# Patient Record
Sex: FEMALE | Race: WHITE | NOT HISPANIC OR LATINO | Employment: OTHER | URBAN - METROPOLITAN AREA
[De-identification: names, ages, dates, MRNs, and addresses within clinical notes are randomized per-mention and may not be internally consistent; named-entity substitution may affect disease eponyms.]

---

## 2023-02-20 PROBLEM — D12.6 ADENOMATOUS COLON POLYP: Status: ACTIVE | Noted: 2023-02-20

## 2023-02-20 PROBLEM — K21.9 ESOPHAGEAL REFLUX: Status: ACTIVE | Noted: 2023-02-20

## 2023-02-20 PROBLEM — M70.70 BURSITIS OF HIP: Status: ACTIVE | Noted: 2023-02-20

## 2023-02-20 PROBLEM — E55.9 VITAMIN D DEFICIENCY: Status: ACTIVE | Noted: 2023-02-20

## 2023-02-20 PROBLEM — M81.0 POSTMENOPAUSAL BONE LOSS: Status: ACTIVE | Noted: 2023-02-20

## 2023-02-20 PROBLEM — H01.009 BLEPHARITIS: Status: ACTIVE | Noted: 2023-02-20

## 2023-02-20 PROBLEM — H04.129 DRY EYE: Status: ACTIVE | Noted: 2023-02-20

## 2023-02-20 PROBLEM — R55 VASOVAGAL EPISODE: Status: ACTIVE | Noted: 2023-02-20

## 2023-02-20 PROBLEM — B37.31 YEAST VAGINITIS: Status: ACTIVE | Noted: 2023-02-20

## 2023-02-20 PROBLEM — E53.8 VITAMIN B12 DEFICIENCY: Status: ACTIVE | Noted: 2023-02-20

## 2023-02-20 PROBLEM — E78.5 HYPERLIPIDEMIA: Status: ACTIVE | Noted: 2023-02-20

## 2023-02-20 PROBLEM — H52.4 PRESBYOPIA: Status: ACTIVE | Noted: 2023-02-20

## 2023-02-20 PROBLEM — H52.4 HYPEROPIA WITH ASTIGMATISM AND PRESBYOPIA: Status: ACTIVE | Noted: 2023-02-20

## 2023-02-20 PROBLEM — N89.8 VAGINAL IRRITATION: Status: ACTIVE | Noted: 2023-02-20

## 2023-02-20 PROBLEM — Z96.1 PSEUDOPHAKIA, LEFT EYE: Status: ACTIVE | Noted: 2023-02-20

## 2023-02-20 PROBLEM — F02.80 FRONTOTEMPORAL DEMENTIA (MULTI): Status: ACTIVE | Noted: 2023-02-20

## 2023-02-20 PROBLEM — N18.31 CKD STAGE G3A/A1, GFR 45-59 AND ALBUMIN CREATININE RATIO <30 MG/G (MULTI): Status: ACTIVE | Noted: 2023-02-20

## 2023-02-20 PROBLEM — H26.9 CATARACT, CORTICAL, BOTH EYES: Status: ACTIVE | Noted: 2023-02-20

## 2023-02-20 PROBLEM — E66.09 CLASS 1 OBESITY DUE TO EXCESS CALORIES WITHOUT SERIOUS COMORBIDITY WITH BODY MASS INDEX (BMI) OF 30.0 TO 30.9 IN ADULT: Status: ACTIVE | Noted: 2023-02-20

## 2023-02-20 PROBLEM — R19.7 DIARRHEA IN ADULT PATIENT: Status: ACTIVE | Noted: 2023-02-20

## 2023-02-20 PROBLEM — H52.00 HYPEROPIA WITH ASTIGMATISM AND PRESBYOPIA: Status: ACTIVE | Noted: 2023-02-20

## 2023-02-20 PROBLEM — I10 HYPERTENSION: Status: ACTIVE | Noted: 2023-02-20

## 2023-02-20 PROBLEM — E03.9 HYPOTHYROIDISM: Status: ACTIVE | Noted: 2023-02-20

## 2023-02-20 PROBLEM — H25.13 CATARACT, NUCLEAR SCLEROTIC, BOTH EYES: Status: ACTIVE | Noted: 2023-02-20

## 2023-02-20 PROBLEM — H02.889 MGD (MEIBOMIAN GLAND DYSFUNCTION): Status: ACTIVE | Noted: 2023-02-20

## 2023-02-20 PROBLEM — N95.2 POSTMENOPAUSAL ATROPHIC VAGINITIS: Status: ACTIVE | Noted: 2023-02-20

## 2023-02-20 PROBLEM — H52.209 HYPEROPIA WITH ASTIGMATISM AND PRESBYOPIA: Status: ACTIVE | Noted: 2023-02-20

## 2023-02-20 PROBLEM — F41.9 ANXIETY: Status: ACTIVE | Noted: 2023-02-20

## 2023-02-20 PROBLEM — M18.11 OSTEOARTHRITIS OF RIGHT THUMB: Status: ACTIVE | Noted: 2023-02-20

## 2023-02-20 PROBLEM — E66.811 CLASS 1 OBESITY DUE TO EXCESS CALORIES WITHOUT SERIOUS COMORBIDITY WITH BODY MASS INDEX (BMI) OF 30.0 TO 30.9 IN ADULT: Status: ACTIVE | Noted: 2023-02-20

## 2023-02-20 PROBLEM — F03.90 DEMENTIA (MULTI): Status: ACTIVE | Noted: 2023-02-20

## 2023-02-20 PROBLEM — E73.9 LACTOSE INTOLERANCE: Status: ACTIVE | Noted: 2023-02-20

## 2023-02-20 PROBLEM — L98.9 BENIGN SKIN LESION OF MULTIPLE SITES: Status: ACTIVE | Noted: 2023-02-20

## 2023-02-20 PROBLEM — G31.09 FRONTOTEMPORAL DEMENTIA (MULTI): Status: ACTIVE | Noted: 2023-02-20

## 2023-02-20 RX ORDER — PAROXETINE 10 MG/1
1 TABLET, FILM COATED ORAL DAILY
COMMUNITY
Start: 2016-02-29 | End: 2023-10-21

## 2023-02-20 RX ORDER — ATORVASTATIN CALCIUM 10 MG/1
10 TABLET, FILM COATED ORAL DAILY
COMMUNITY
Start: 2017-01-10 | End: 2023-03-09

## 2023-02-20 RX ORDER — MEMANTINE HYDROCHLORIDE 10 MG/1
1 TABLET ORAL 2 TIMES DAILY
COMMUNITY
Start: 2016-02-29 | End: 2023-04-06

## 2023-02-20 RX ORDER — LOSARTAN POTASSIUM 50 MG/1
1 TABLET ORAL DAILY
COMMUNITY
Start: 2020-08-06 | End: 2023-10-21

## 2023-02-20 RX ORDER — DONEPEZIL HYDROCHLORIDE 10 MG/1
1 TABLET, FILM COATED ORAL DAILY
COMMUNITY
Start: 2018-12-29 | End: 2024-01-08

## 2023-02-20 RX ORDER — LEVOTHYROXINE SODIUM 150 UG/1
1 TABLET ORAL DAILY
COMMUNITY
Start: 2021-01-05 | End: 2024-04-18 | Stop reason: ALTCHOICE

## 2023-02-20 RX ORDER — LEVOTHYROXINE SODIUM 137 UG/1
1 TABLET ORAL DAILY
COMMUNITY
Start: 2022-09-03 | End: 2023-10-11

## 2023-02-20 RX ORDER — AMLODIPINE BESYLATE 10 MG/1
1 TABLET ORAL DAILY
COMMUNITY
Start: 2014-07-21 | End: 2024-02-05 | Stop reason: SDUPTHER

## 2023-03-09 DIAGNOSIS — E78.5 HYPERLIPIDEMIA, UNSPECIFIED HYPERLIPIDEMIA TYPE: Primary | ICD-10-CM

## 2023-03-09 RX ORDER — ATORVASTATIN CALCIUM 10 MG/1
TABLET, FILM COATED ORAL
Qty: 90 TABLET | Refills: 3 | Status: SHIPPED | OUTPATIENT
Start: 2023-03-09 | End: 2024-02-05 | Stop reason: SDUPTHER

## 2023-03-19 NOTE — PROGRESS NOTES
Subjective   Reason for Visit: Daksha Mcdonald is an 82 y.o. female here for her Subsequent Medicare Assessment               She only wears OTC readers for fine print     She complains of urinary incontinence   She has 1 episode of nocturia  She drinks liquids throughout the day.  There are times she has trouble holding her urine  She has not lost control yet.   She drinks 1 cup of coffee in the morning. She used to drink 2 cups     She is not exercising.  She is going to start walking   She has no balance issues to report     HEALTH:  PAP 6/10 - and no longer needed  Mammo 9/13, 10/15, 5/18, 7/19, 9/2020, 2/2022 and declines repeat   BD 8/12 T-1.2, 10/15 T-1.9, 5/18 T-1.5 9/2020 T-1.4, ordered 3/2023   Colon 4/12, 2/16 + adenomas and 4/2021 showed hyperplastic polyp and Q 3-5- this may be her last one   EKG 5/14 negative , 1/10/17, 5/19, 2/2021, 5/2022  Urine 5/19   Flu 2014 , 11/16, 10/18, 10/19, 9/2020, 11/2021, 1/2022, 10/2022  TDAP unknown and will update with injury   Prevnar 9/15   Pneumovax 11/2007 and last one 1/10/17   Zostavax never  Shingrix recommended and will check local pharmacies   Moderna CVD 3/1/2021 and 3/29/2021 booster 11/17/2021  Ophth She was seen with Dr Lopez. No glaucoma or MD. She had cataract surgery OU.   Copy of her Advanced Directives scanned in the chart 2/10/2022    Patient Care Team:  Tammy Alex MD as PCP - General  Tammy Alex MD as PCP - [employer] PCP     Review of Systems  All systems negative except those listed in the HPI     Objective   Vitals:  Visit Vitals  /60   Pulse 65   Temp 36.8 °C (98.2 °F)   Resp 16      Body mass index is 29.99 kg/m².     Physical Exam  Vitals reviewed. Exam conducted with a chaperone present.   Constitutional:       Appearance: Normal appearance.   HENT:      Head: Normocephalic.      Right Ear: Tympanic membrane, ear canal and external ear normal.      Left Ear: Tympanic membrane, ear canal and external ear normal.       Nose: Nose normal.      Mouth/Throat:      Pharynx: Oropharynx is clear.   Eyes:      Conjunctiva/sclera: Conjunctivae normal.   Cardiovascular:      Rate and Rhythm: Normal rate and regular rhythm.      Pulses: Normal pulses.      Heart sounds: Normal heart sounds.   Pulmonary:      Effort: Pulmonary effort is normal.      Breath sounds: Normal breath sounds.   Abdominal:      General: Bowel sounds are normal.      Palpations: Abdomen is soft.   Musculoskeletal:         General: Normal range of motion.      Cervical back: Normal range of motion and neck supple.   Skin:     General: Skin is warm.   Neurological:      General: No focal deficit present.      Mental Status: She is alert and oriented to person, place, and time.   Psychiatric:         Mood and Affect: Mood normal.         Behavior: Behavior normal.         Judgment: Judgment normal.       Assessment/Plan   Problem List Items Addressed This Visit          Endocrine/Metabolic    Hypothyroidism    Relevant Orders    TSH with reflex to Free T4 if abnormal       Other    Hyperlipidemia     Continue current regimen          Relevant Orders    CBC    Comprehensive Metabolic Panel    Lipid Panel     Other Visit Diagnoses       Asymptomatic menopausal state    -  Primary    Relevant Orders    XR DEXA bone density    Visit for screening mammogram               Subsequent Medicare Assessment completed  Labs ordered     Medicare Wellness completed  -  Discussed healthy diet and regular exercise.    -  Physical exam overall unremarkable. Immunizations reviewed and updated accordingly. Healthy lifestyle choices discussed (tobacco avoidance, appropriate alcohol use, avoidance of illicit substances).   -  Patient is wearing seatbelt.   -  Screening lab work ordered as indicated.    -  Age appropriate screening tests reviewed with patient.     We spent 15 minutes discussing depression screen and there is nothing found that is of concern for underling depression. The  PQH form was filled and the meds reviewed. She has no depression to report and good results with Paxil. She is more anxious than depressed     We spent 15 minutes discussing alcohol use and there are no concerns about overuse. The 15 min was spent in going over any issues of use of alcohol. Rare and socially only     She has grab bars in the shower.  She has not fallen recently and no risk of falls in the house   She has good lighting around the house and functioning smoke detectors.     She has no issues with her hearing to report     Her weight in office today is 172 with BMI of 29.99.   We spent 15 minutes discussing diet and weight loss  The struggle of weight loss persists  Recommend she lose 10 pounds. She is going to start walking     HTN: Stable   Continue amlodipine 10 mg daily and losartan 50 mg daily  EKG was normal 5/2022, no LVH or strain pattern noted   Stress test in 7/13 was normal as well.   Discussed diet and exercise for better BP control     I have spent 15 min face to face with this patient discussing their cardiac risk and behavioral therapies of nutrition choices and exercise. We are trying to eliminate habits that are contributing to their cardiac risk.  We agreed on a plan of how they can reduce their current CV risk   The patient's 10 yr CV risk was estimated at 22 %. We can decrease this to 18% with tighter control of her BP and increase the statin 2/2022. Did not recalculate due to age 3/2023.    HLD: Labs ordered, we will adjust if indicated 3/2023  Explained goal for LDL to  be less than 100 and ideally less than 70   Continue Lipitor 10 mg a day and ASA 81 mg 3 times a week   Discussed diet and exercise for better control of lipids     Hypothyroid: Labs ordered, we will adjust if indicated 3/2023  Continue levothyroxine 135 mcg daily     Pre-frontal cortical lobe dementia.   She feels her memory is fine and no recent decline per family   Continue memantine 10 mg daily and Aricept 10 mg  a day.  CT brain 2014 was normal.   She does not pay the bills but she does check over the accounts to balance them.   She can see Dr John SCHWARTZ now.     Anxiety:   She tried getting off Paxil twice and she did not do so well.  We tried to stop Paxil in 2015 and she was crying uncontrollably   Continue Paxil 10 mg daily. She feels this is helping with her anxiety.     GERD:  Improved. She is no longer taking omeprazole   EGD was mild gastritis and no polyps.    Urge incontinence: Explained diagnosis to patient in detail 3/2023  She complains of urinary incontinence   She has 1 episode of nocturia  She drinks liquids throughout the day.  There are times she has trouble holding her urine  She has not lost control yet.   She drinks 1 cup of coffee in the morning. She used to drink 2 cups   She drinks iced tea at night  Discussed bladder irritants and recommend she avoid coffee and tea   Recommend she stop drinking after 8 pm     Did great post Lap Choly at  on 5/21/14 with Dr Burnett.     Vitamin D deficiency:  Continue OTC vitamin D 3 2000 units daily  She is on oral B12 for her B12 deficiency and continue    Cold sore on the lip:  Recommend she try Abreva OTC until resolved     Eczema bilateral LE:  Recommended patient see dermatology. Patient declines at this time.    She does not need PAP anymore. She has no vaginal issues.   Mammo was normal in 2/2022 and declines repeating   Breast exam normal 3/2023  BD in 9/2020 was T- 1.4 and ordered 3/2023. Continue Calcium 600 mg BID, OTC Vitamin D 2000 UT daily and eat 2 servings of calcium enriched foods daily     Colonoscopy 4/2021 showed hyperplastic polyp x 2 and this may be her last one   She has a FmHx of colon cancer.     Ophth:   She was seen with Dr Lopez. No glaucoma or MD. She had cataract surgery OU. She wears OTC reading glasses   She will have her next eye exam faxed to my office in order to update her medical records.     I spent 15 min with the patient  discussing their wishes for end of life choices.   We discussed the need for a Living Will and that wishes should be discussed with Family.   The DNR status was reviewed, and we discussed the options of this and, the DNR _CC options as well.   We also went over how important it was to have these choices written down and clear for any surviving family so that their wishes are followed   The patient and I came to to following agreement : Her  is her medical POA and daughter She has a living will.   Copy of her Advanced Directives scanned in the chart 2/10/2022      Flu 2014 , 11/16, 10/18, 10/19, 9/2020, 11/2021, 1/2022, 10/2022   TDAP unknown and will update with injury   Prevnar 9/15   Pneumovax 11/2007 and last one 1/10/17   Zostavax never  Shingrix recommended and will check local pharmacies   Moderna CVD 3/1/2021 and 3/29/2021 booster 11/17/2021    RTC in 6 months for follow up or sooner if needed     Scribe Attestation  By signing my name below, I, Lubna Mathur  , Scribe   attest that this documentation has been prepared under the direction and in the presence of Tammy Alex MD.

## 2023-03-20 ENCOUNTER — OFFICE VISIT (OUTPATIENT)
Dept: PRIMARY CARE | Facility: CLINIC | Age: 83
End: 2023-03-20
Payer: MEDICARE

## 2023-03-20 VITALS
OXYGEN SATURATION: 96 % | HEART RATE: 65 BPM | WEIGHT: 172 LBS | RESPIRATION RATE: 16 BRPM | DIASTOLIC BLOOD PRESSURE: 60 MMHG | BODY MASS INDEX: 29.37 KG/M2 | TEMPERATURE: 98.2 F | HEIGHT: 64 IN | SYSTOLIC BLOOD PRESSURE: 138 MMHG

## 2023-03-20 DIAGNOSIS — N18.31 CKD STAGE G3A/A1, GFR 45-59 AND ALBUMIN CREATININE RATIO <30 MG/G (MULTI): ICD-10-CM

## 2023-03-20 DIAGNOSIS — Z12.31 VISIT FOR SCREENING MAMMOGRAM: ICD-10-CM

## 2023-03-20 DIAGNOSIS — E78.2 MIXED HYPERLIPIDEMIA: ICD-10-CM

## 2023-03-20 DIAGNOSIS — Z00.00 HEALTHCARE MAINTENANCE: ICD-10-CM

## 2023-03-20 DIAGNOSIS — F02.80 FRONTOTEMPORAL DEMENTIA (MULTI): ICD-10-CM

## 2023-03-20 DIAGNOSIS — F01.A0 MILD VASCULAR DEMENTIA WITHOUT BEHAVIORAL DISTURBANCE, PSYCHOTIC DISTURBANCE, MOOD DISTURBANCE, OR ANXIETY (MULTI): ICD-10-CM

## 2023-03-20 DIAGNOSIS — G31.09 FRONTOTEMPORAL DEMENTIA (MULTI): ICD-10-CM

## 2023-03-20 DIAGNOSIS — Z78.0 ASYMPTOMATIC MENOPAUSAL STATE: Primary | ICD-10-CM

## 2023-03-20 DIAGNOSIS — E03.9 HYPOTHYROIDISM, UNSPECIFIED TYPE: ICD-10-CM

## 2023-03-20 PROBLEM — M81.0 POSTMENOPAUSAL BONE LOSS: Status: RESOLVED | Noted: 2023-02-20 | Resolved: 2023-03-20

## 2023-03-20 PROBLEM — M18.11 OSTEOARTHRITIS OF RIGHT THUMB: Status: RESOLVED | Noted: 2023-02-20 | Resolved: 2023-03-20

## 2023-03-20 PROBLEM — M70.70 BURSITIS OF HIP: Status: RESOLVED | Noted: 2023-02-20 | Resolved: 2023-03-20

## 2023-03-20 PROCEDURE — 1036F TOBACCO NON-USER: CPT | Performed by: INTERNAL MEDICINE

## 2023-03-20 PROCEDURE — 1170F FXNL STATUS ASSESSED: CPT | Performed by: INTERNAL MEDICINE

## 2023-03-20 PROCEDURE — 1160F RVW MEDS BY RX/DR IN RCRD: CPT | Performed by: INTERNAL MEDICINE

## 2023-03-20 PROCEDURE — G0439 PPPS, SUBSEQ VISIT: HCPCS | Performed by: INTERNAL MEDICINE

## 2023-03-20 PROCEDURE — 3078F DIAST BP <80 MM HG: CPT | Performed by: INTERNAL MEDICINE

## 2023-03-20 PROCEDURE — 3075F SYST BP GE 130 - 139MM HG: CPT | Performed by: INTERNAL MEDICINE

## 2023-03-20 PROCEDURE — 1157F ADVNC CARE PLAN IN RCRD: CPT | Performed by: INTERNAL MEDICINE

## 2023-03-20 PROCEDURE — 1159F MED LIST DOCD IN RCRD: CPT | Performed by: INTERNAL MEDICINE

## 2023-03-20 ASSESSMENT — ACTIVITIES OF DAILY LIVING (ADL)
BATHING: INDEPENDENT
DOING_HOUSEWORK: INDEPENDENT
GROCERY_SHOPPING: INDEPENDENT
DRESSING: INDEPENDENT
TAKING_MEDICATION: INDEPENDENT
MANAGING_FINANCES: INDEPENDENT

## 2023-03-20 ASSESSMENT — PATIENT HEALTH QUESTIONNAIRE - PHQ9
2. FEELING DOWN, DEPRESSED OR HOPELESS: NOT AT ALL
1. LITTLE INTEREST OR PLEASURE IN DOING THINGS: NOT AT ALL
SUM OF ALL RESPONSES TO PHQ9 QUESTIONS 1 AND 2: 0

## 2023-03-20 NOTE — ASSESSMENT & PLAN NOTE
She feels her memory is fine and no recent decline per family   Continue memantine 10 mg daily and Aricept 10 mg a day.

## 2023-04-06 DIAGNOSIS — F01.A0 MILD VASCULAR DEMENTIA WITHOUT BEHAVIORAL DISTURBANCE, PSYCHOTIC DISTURBANCE, MOOD DISTURBANCE, OR ANXIETY (MULTI): Primary | ICD-10-CM

## 2023-04-06 RX ORDER — MEMANTINE HYDROCHLORIDE 10 MG/1
TABLET ORAL
Qty: 180 TABLET | Refills: 3 | Status: SHIPPED | OUTPATIENT
Start: 2023-04-06 | End: 2024-04-04 | Stop reason: SDUPTHER

## 2023-04-10 ENCOUNTER — LAB (OUTPATIENT)
Dept: LAB | Facility: LAB | Age: 83
End: 2023-04-10
Payer: MEDICARE

## 2023-04-10 DIAGNOSIS — E03.9 HYPOTHYROIDISM, UNSPECIFIED TYPE: ICD-10-CM

## 2023-04-10 DIAGNOSIS — E78.2 MIXED HYPERLIPIDEMIA: ICD-10-CM

## 2023-04-10 LAB
ALANINE AMINOTRANSFERASE (SGPT) (U/L) IN SER/PLAS: 11 U/L (ref 7–45)
ALBUMIN (G/DL) IN SER/PLAS: 4.2 G/DL (ref 3.4–5)
ALKALINE PHOSPHATASE (U/L) IN SER/PLAS: 90 U/L (ref 33–136)
ANION GAP IN SER/PLAS: 10 MMOL/L (ref 10–20)
ASPARTATE AMINOTRANSFERASE (SGOT) (U/L) IN SER/PLAS: 16 U/L (ref 9–39)
BILIRUBIN TOTAL (MG/DL) IN SER/PLAS: 0.5 MG/DL (ref 0–1.2)
CALCIUM (MG/DL) IN SER/PLAS: 9.1 MG/DL (ref 8.6–10.3)
CARBON DIOXIDE, TOTAL (MMOL/L) IN SER/PLAS: 29 MMOL/L (ref 21–32)
CHLORIDE (MMOL/L) IN SER/PLAS: 105 MMOL/L (ref 98–107)
CHOLESTEROL (MG/DL) IN SER/PLAS: 171 MG/DL (ref 0–199)
CHOLESTEROL IN HDL (MG/DL) IN SER/PLAS: 52.1 MG/DL
CHOLESTEROL/HDL RATIO: 3.3
CREATININE (MG/DL) IN SER/PLAS: 1.17 MG/DL (ref 0.5–1.05)
ERYTHROCYTE DISTRIBUTION WIDTH (RATIO) BY AUTOMATED COUNT: 12.8 % (ref 11.5–14.5)
ERYTHROCYTE MEAN CORPUSCULAR HEMOGLOBIN CONCENTRATION (G/DL) BY AUTOMATED: 31.5 G/DL (ref 32–36)
ERYTHROCYTE MEAN CORPUSCULAR VOLUME (FL) BY AUTOMATED COUNT: 93 FL (ref 80–100)
ERYTHROCYTES (10*6/UL) IN BLOOD BY AUTOMATED COUNT: 4.83 X10E12/L (ref 4–5.2)
GFR FEMALE: 47 ML/MIN/1.73M2
GLUCOSE (MG/DL) IN SER/PLAS: 95 MG/DL (ref 74–99)
HEMATOCRIT (%) IN BLOOD BY AUTOMATED COUNT: 45.1 % (ref 36–46)
HEMOGLOBIN (G/DL) IN BLOOD: 14.2 G/DL (ref 12–16)
LDL: 91 MG/DL (ref 0–99)
LEUKOCYTES (10*3/UL) IN BLOOD BY AUTOMATED COUNT: 6.9 X10E9/L (ref 4.4–11.3)
PLATELETS (10*3/UL) IN BLOOD AUTOMATED COUNT: 307 X10E9/L (ref 150–450)
POTASSIUM (MMOL/L) IN SER/PLAS: 4.2 MMOL/L (ref 3.5–5.3)
PROTEIN TOTAL: 6.4 G/DL (ref 6.4–8.2)
SODIUM (MMOL/L) IN SER/PLAS: 140 MMOL/L (ref 136–145)
THYROTROPIN (MIU/L) IN SER/PLAS BY DETECTION LIMIT <= 0.05 MIU/L: 2.38 MIU/L (ref 0.44–3.98)
TRIGLYCERIDE (MG/DL) IN SER/PLAS: 141 MG/DL (ref 0–149)
UREA NITROGEN (MG/DL) IN SER/PLAS: 17 MG/DL (ref 6–23)
VLDL: 28 MG/DL (ref 0–40)

## 2023-04-10 PROCEDURE — 36415 COLL VENOUS BLD VENIPUNCTURE: CPT

## 2023-04-10 PROCEDURE — 80053 COMPREHEN METABOLIC PANEL: CPT

## 2023-04-10 PROCEDURE — 84443 ASSAY THYROID STIM HORMONE: CPT

## 2023-04-10 PROCEDURE — 85027 COMPLETE CBC AUTOMATED: CPT

## 2023-04-10 PROCEDURE — 80061 LIPID PANEL: CPT

## 2023-05-10 NOTE — PROGRESS NOTES
Subjective   Patient ID: Daksha Mcdonald is a 82 y.o. female who presents for evaluation of left breast pain    She has pain in the left breast that began on approx 5/4/2023  She has not been lifting or moving things   The pain was around the left breast   She denies pain to the right breast     She complains of her hair being fizzy.  She has not changed hair products or started any new medications     HEALTH:  PAP 6/10 - and no longer needed  Mammo 9/13, 10/15, 5/18, 7/19, 9/2020, 2/2022, ordered 5/2023   BD 8/12 T-1.2, 10/15 T-1.9, 5/18 T-1.5 9/2020 T-1.4, 3/2023 T-1.7  Colon 4/12, 2/16 and 4/2021 showed hyperplastic polyp and Q 3-5- this may be her last one   EKG 5/14 negative , 1/10/17, 5/19, 2/2021, 5/2022  Urine 5/19   Flu 2014 , 11/16, 10/18, 10/19, 9/2020, 11/2021, 1/2022, 10/2022  TDAP unknown and will update with injury   Prevnar 9/15   Pneumovax 11/2007 and last one 1/10/17   Zostavax never  Shingrix recommended and will check local pharmacies   Moderna CVD 3/1/2021 and 3/29/2021 booster 11/17/2021  Ophth She was seen with Dr Lopez. No glaucoma or MD. She had cataract surgery OU.   Copy of her Advanced Directives scanned in the chart 2/10/2022        Review of Systems  All systems negative except those listed in the HPI      Objective   Visit Vitals  /70 (BP Location: Right arm, Patient Position: Sitting, BP Cuff Size: Adult)   Pulse 76    Body mass index is 30.16 kg/m².      Physical Exam  Vitals reviewed.   Constitutional:       Appearance: Normal appearance.   HENT:      Head: Normocephalic.      Right Ear: Tympanic membrane, ear canal and external ear normal.      Left Ear: Tympanic membrane, ear canal and external ear normal.      Nose: Nose normal.      Mouth/Throat:      Pharynx: Oropharynx is clear.   Eyes:      Conjunctiva/sclera: Conjunctivae normal.   Cardiovascular:      Rate and Rhythm: Normal rate and regular rhythm.      Pulses: Normal pulses.      Heart sounds: Normal heart sounds.    Pulmonary:      Effort: Pulmonary effort is normal.      Breath sounds: Normal breath sounds.   Chest:      Comments: tenderness left pectoral muscle area, no nodules or masses noted bilateral breasts  Abdominal:      General: Bowel sounds are normal.      Palpations: Abdomen is soft.   Musculoskeletal:         General: Normal range of motion.      Cervical back: Normal range of motion and neck supple.      Comments: tenderness left pectoral muscle   Skin:     General: Skin is warm.      Comments: Integumentary : the back of her hair is brittle and breaking, her hair is dry and feels like there is too much hair products in her hair    Neurological:      General: No focal deficit present.      Mental Status: She is alert and oriented to person, place, and time.   Psychiatric:         Mood and Affect: Mood normal.         Behavior: Behavior normal.         Thought Content: Thought content normal.         Judgment: Judgment normal.       Assessment/Plan   Problem List Items Addressed This Visit    None  Visit Diagnoses       Breast tenderness in female    -  Primary    Relevant Orders    BI mammo left diagnostic tomosynthesis    BI US breast limited left               Follow up completed   Reviewed her labs from 4/2023     Left breast pain outer quadrant without palpable mass: On exam : tenderness left pectoral muscle area, no nodules or masses noted bilateral breasts, no tenderness to the ribs 5/2023   She has pain in the left breast that began on approx 5/4/2023  She has not been lifting or moving things   The pain was around the left breast    She denies pain to the right breast   Mammo was normal in 2/2022 and orders placed today for left breast diagnostic mammo 5/2023. Radiology can determine if she needs an US of the breasts     Brittle hair: the back of her hair is brittle and breaking, her hair is dry and feels like there is too much hair products in her hair 5/2023. We increased her thyroid medications 6  months ago and this may be residual Sx from her thyroid levels   She complains of her hair being fizzy.  She has not changed hair products or started any new medications   She has a water softener on her house  She admits to laying on the couch a lot to watch television   She is washing her hair twice when she washes it   She does color her hair and she does not think her hairdresser has changed products   Recommend she get a silk pillow case to lay on to keep hair from breaking   Stop washing hair twice when showering   Recommend she talk with her  to see if there is a hair mask she can use to help with the brittle nature of her hair      She has no issues with her hearing to report     Her weight in office today is 173 with BMI of 30.16. We spent 15 minutes discussing diet and weight loss. The struggle of weight loss persists  Recommend she lose 10 pounds.    HTN: Stable   Continue amlodipine 10 mg daily and losartan 50 mg daily  EKG was normal 5/2022, no LVH or strain pattern noted   Stress test in 7/13 was normal as well.   Discussed diet and exercise for better BP control     The patient's 10 yr CV risk was estimated at 22 %. We can decrease this to 18% with tighter control of her BP and increase the statin 2/2022. Did not recalculate due to age 3/2023.    HLD: LDL 91 and HDL 52 on labs in 4/2023   Explained goal for LDL to  be less than 100 and ideally less than 70   Continue Lipitor 10 mg a day and ASA 81 mg 3 times a week   Discussed diet and exercise for better control of lipids     Hypothyroid: TSH 2.38 on labs in 4/2023  Continue levothyroxine 135 mcg daily     Pre-frontal cortical lobe dementia.   She feels her memory is fine and no recent decline per family   Continue memantine 10 mg daily and Aricept 10 mg a day.  CT brain 2014 was normal.   She does not pay the bills, she does check over the accounts to balance them.   She can see Dr John SCHWARTZ now.     Anxiety:   She tried getting off Paxil  twice and she did not do so well.  We tried to stop Paxil in 2015 and she was crying uncontrollably   Continue Paxil 10 mg daily. She feels this is helping with her anxiety.     GERD:  Improved. She is no longer taking omeprazole   EGD was mild gastritis and no polyps.    Urge incontinence:    Discussed bladder irritants and recommend she avoid coffee and tea   Recommend she stop drinking after 8 pm     Did great post Lap Choly at  on 5/21/14 with Dr Burnett.     Vitamin D deficiency:  Continue OTC vitamin D 3 2000 units daily  She is on oral B12 for her B12 deficiency and continue    Cold sore on the lip:  Recommend she try Abreva OTC until resolved     Eczema bilateral LE:  Recommended patient see dermatology. Patient declines at this time.    She does not need PAP anymore. She has no vaginal issues.   Mammo was normal in 2/2022 and declines repeating   Breast exam normal 3/2023  BD in 3/2023 T-1.7. Continue Calcium 600 mg BID, OTC Vitamin D 2000 UT daily and eat 2 servings of calcium enriched foods daily. Discussed importance of weight bearing exercises     Colonoscopy 4/2021 showed hyperplastic polyp x 2 and this may be her last one. She has a FmHx of colon cancer.     Ophth:   She was seen with Dr Lopez. No glaucoma or MD. She had cataract surgery OU. She wears OTC reading glasses   She will have her next eye exam faxed to my office in order to update her medical records.     Her  is her medical POA and daughter She has a living will.   Copy of her Advanced Directives scanned in the chart 2/10/2022      Flu 2014 , 11/16, 10/18, 10/19, 9/2020, 11/2021, 1/2022, 10/2022   TDAP unknown and will update with injury   Prevnar 9/15   Pneumovax 11/2007 and last one 1/10/17   Zostavax never  Shingrix recommended and will check local pharmacies   Moderna CVD 3/1/2021 and 3/29/2021 booster 11/17/2021    RTC in 6 months for follow up or sooner if needed     Scribe Attestation  By signing my name below, Lubna SCHAFER  Aline Mathur   attest that this documentation has been prepared under the direction and in the presence of Tammy Alex MD.

## 2023-05-11 ENCOUNTER — OFFICE VISIT (OUTPATIENT)
Dept: PRIMARY CARE | Facility: CLINIC | Age: 83
End: 2023-05-11
Payer: MEDICARE

## 2023-05-11 VITALS
BODY MASS INDEX: 29.53 KG/M2 | WEIGHT: 173 LBS | HEIGHT: 64 IN | SYSTOLIC BLOOD PRESSURE: 116 MMHG | DIASTOLIC BLOOD PRESSURE: 70 MMHG | HEART RATE: 76 BPM | OXYGEN SATURATION: 98 %

## 2023-05-11 DIAGNOSIS — N64.4 BREAST TENDERNESS IN FEMALE: Primary | ICD-10-CM

## 2023-05-11 DIAGNOSIS — I10 PRIMARY HYPERTENSION: ICD-10-CM

## 2023-05-11 DIAGNOSIS — N18.31 CKD STAGE G3A/A1, GFR 45-59 AND ALBUMIN CREATININE RATIO <30 MG/G (MULTI): ICD-10-CM

## 2023-05-11 DIAGNOSIS — E03.9 HYPOTHYROIDISM, UNSPECIFIED TYPE: ICD-10-CM

## 2023-05-11 DIAGNOSIS — E78.5 HYPERLIPIDEMIA, UNSPECIFIED HYPERLIPIDEMIA TYPE: ICD-10-CM

## 2023-05-11 PROBLEM — N89.8 VAGINAL IRRITATION: Status: RESOLVED | Noted: 2023-02-20 | Resolved: 2023-05-11

## 2023-05-11 PROBLEM — B37.31 YEAST VAGINITIS: Status: RESOLVED | Noted: 2023-02-20 | Resolved: 2023-05-11

## 2023-05-11 PROBLEM — R19.7 DIARRHEA IN ADULT PATIENT: Status: RESOLVED | Noted: 2023-02-20 | Resolved: 2023-05-11

## 2023-05-11 PROCEDURE — 3078F DIAST BP <80 MM HG: CPT | Performed by: INTERNAL MEDICINE

## 2023-05-11 PROCEDURE — 1159F MED LIST DOCD IN RCRD: CPT | Performed by: INTERNAL MEDICINE

## 2023-05-11 PROCEDURE — 99214 OFFICE O/P EST MOD 30 MIN: CPT | Performed by: INTERNAL MEDICINE

## 2023-05-11 PROCEDURE — 1157F ADVNC CARE PLAN IN RCRD: CPT | Performed by: INTERNAL MEDICINE

## 2023-05-11 PROCEDURE — 3074F SYST BP LT 130 MM HG: CPT | Performed by: INTERNAL MEDICINE

## 2023-05-11 PROCEDURE — 1160F RVW MEDS BY RX/DR IN RCRD: CPT | Performed by: INTERNAL MEDICINE

## 2023-05-11 PROCEDURE — 1036F TOBACCO NON-USER: CPT | Performed by: INTERNAL MEDICINE

## 2023-10-11 DIAGNOSIS — E03.9 HYPOTHYROIDISM, UNSPECIFIED TYPE: Primary | ICD-10-CM

## 2023-10-11 RX ORDER — LEVOTHYROXINE SODIUM 137 UG/1
137 TABLET ORAL DAILY
Qty: 90 TABLET | Refills: 3 | Status: SHIPPED | OUTPATIENT
Start: 2023-10-11 | End: 2024-02-05 | Stop reason: SDUPTHER

## 2023-10-21 DIAGNOSIS — I10 PRIMARY HYPERTENSION: Primary | ICD-10-CM

## 2023-10-21 DIAGNOSIS — F41.9 ANXIETY: ICD-10-CM

## 2023-10-21 RX ORDER — PAROXETINE 10 MG/1
10 TABLET, FILM COATED ORAL DAILY
Qty: 90 TABLET | Refills: 3 | Status: SHIPPED | OUTPATIENT
Start: 2023-10-21 | End: 2024-02-15 | Stop reason: SDUPTHER

## 2023-10-21 RX ORDER — LOSARTAN POTASSIUM 50 MG/1
50 TABLET ORAL DAILY
Qty: 90 TABLET | Refills: 3 | Status: SHIPPED | OUTPATIENT
Start: 2023-10-21 | End: 2024-02-15 | Stop reason: SDUPTHER

## 2024-01-08 DIAGNOSIS — F01.A0 MILD VASCULAR DEMENTIA WITHOUT BEHAVIORAL DISTURBANCE, PSYCHOTIC DISTURBANCE, MOOD DISTURBANCE, OR ANXIETY (MULTI): Primary | ICD-10-CM

## 2024-01-08 RX ORDER — DONEPEZIL HYDROCHLORIDE 10 MG/1
10 TABLET, FILM COATED ORAL DAILY
Qty: 90 TABLET | Refills: 3 | Status: SHIPPED | OUTPATIENT
Start: 2024-01-08 | End: 2024-03-15 | Stop reason: SDUPTHER

## 2024-01-26 ENCOUNTER — OFFICE VISIT (OUTPATIENT)
Dept: PRIMARY CARE | Facility: CLINIC | Age: 84
End: 2024-01-26
Payer: MEDICARE

## 2024-01-26 ENCOUNTER — HOSPITAL ENCOUNTER (OUTPATIENT)
Dept: RADIOLOGY | Facility: CLINIC | Age: 84
Discharge: HOME | End: 2024-01-26
Payer: MEDICARE

## 2024-01-26 VITALS
DIASTOLIC BLOOD PRESSURE: 66 MMHG | OXYGEN SATURATION: 96 % | HEART RATE: 67 BPM | HEIGHT: 64 IN | TEMPERATURE: 97.3 F | BODY MASS INDEX: 29.88 KG/M2 | SYSTOLIC BLOOD PRESSURE: 146 MMHG | WEIGHT: 175 LBS

## 2024-01-26 DIAGNOSIS — M75.01 ADHESIVE CAPSULITIS OF RIGHT SHOULDER: Primary | ICD-10-CM

## 2024-01-26 DIAGNOSIS — M75.01 ADHESIVE CAPSULITIS OF RIGHT SHOULDER: ICD-10-CM

## 2024-01-26 PROCEDURE — 73030 X-RAY EXAM OF SHOULDER: CPT | Mod: RT

## 2024-01-26 PROCEDURE — 1036F TOBACCO NON-USER: CPT | Performed by: NURSE PRACTITIONER

## 2024-01-26 PROCEDURE — 3078F DIAST BP <80 MM HG: CPT | Performed by: NURSE PRACTITIONER

## 2024-01-26 PROCEDURE — 1157F ADVNC CARE PLAN IN RCRD: CPT | Performed by: NURSE PRACTITIONER

## 2024-01-26 PROCEDURE — 99213 OFFICE O/P EST LOW 20 MIN: CPT | Performed by: NURSE PRACTITIONER

## 2024-01-26 PROCEDURE — 73030 X-RAY EXAM OF SHOULDER: CPT | Mod: RIGHT SIDE | Performed by: RADIOLOGY

## 2024-01-26 PROCEDURE — 3077F SYST BP >= 140 MM HG: CPT | Performed by: NURSE PRACTITIONER

## 2024-01-26 ASSESSMENT — PATIENT HEALTH QUESTIONNAIRE - PHQ9
1. LITTLE INTEREST OR PLEASURE IN DOING THINGS: NOT AT ALL
SUM OF ALL RESPONSES TO PHQ9 QUESTIONS 1 AND 2: 0
2. FEELING DOWN, DEPRESSED OR HOPELESS: NOT AT ALL

## 2024-01-26 NOTE — PROGRESS NOTES
"Subjective   Patient ID: Daksha Mcdonald is a 83 y.o. female who presents for sore shoulder (Right side.).    Patient of Dr. Alex.    Presents with right shoulder pain which starts at the shoulder and ends just above her elbow. Describes as sore and achy. Has assocaited loss of ability to lift her arm overhead. Feels better when she holds her arm off across her body.   No numbness of tingling.   No injury that she can recall but  states she did fall recently.   She did fall weeks ago. Tripped over slippers and took a little tumble but doesn't remember falling onto arm but states may have landed on an outstretched arm to catch herself. States never hgad immediate pain or symptoms.  No vaccines recently in this arm.   No history of shoulder issues.   Tried XS tylenol OTC which doesn't help much.          Review of Systems  otherwise negative aside from what was mentioned above in HPI.    Objective   /66   Pulse 67   Temp 36.3 °C (97.3 °F)   Ht 1.613 m (5' 3.5\")   Wt 79.4 kg (175 lb)   SpO2 96%   BMI 30.51 kg/m²     Physical Exam  Constitutional:       Appearance: Normal appearance.   Cardiovascular:      Rate and Rhythm: Normal rate and regular rhythm.   Pulmonary:      Effort: Pulmonary effort is normal.   Abdominal:      General: Abdomen is flat.   Musculoskeletal:      Right shoulder: Deformity, tenderness and bony tenderness present. Decreased range of motion. Decreased strength.      Left shoulder: Normal.   Neurological:      General: No focal deficit present.      Mental Status: She is alert and oriented to person, place, and time. Mental status is at baseline.   Psychiatric:         Mood and Affect: Mood normal.         Behavior: Behavior normal.         Thought Content: Thought content normal.         Judgment: Judgment normal.         Assessment/Plan   Problem List Items Addressed This Visit             ICD-10-CM    Adhesive capsulitis of right shoulder - Primary M75.01     Discussed " possibilities including frozen shoulder, shoulder dislocation, fracture, rotator cuff etiology.   XR shoulder ordered.  Hold off on PT until XR is complete.         Relevant Orders    XR shoulder right 2+ views    Referral to Orthopaedic Surgery

## 2024-01-26 NOTE — ASSESSMENT & PLAN NOTE
Discussed possibilities including frozen shoulder, shoulder dislocation, fracture, rotator cuff etiology.   XR shoulder ordered.  Hold off on PT until XR is complete.

## 2024-01-29 ENCOUNTER — TELEPHONE (OUTPATIENT)
Dept: PRIMARY CARE | Facility: CLINIC | Age: 84
End: 2024-01-29
Payer: MEDICARE

## 2024-02-05 DIAGNOSIS — E03.9 HYPOTHYROIDISM, UNSPECIFIED TYPE: ICD-10-CM

## 2024-02-05 DIAGNOSIS — E78.5 HYPERLIPIDEMIA, UNSPECIFIED HYPERLIPIDEMIA TYPE: ICD-10-CM

## 2024-02-05 RX ORDER — LEVOTHYROXINE SODIUM 137 UG/1
137 TABLET ORAL DAILY
Qty: 90 TABLET | Refills: 0 | Status: SHIPPED | OUTPATIENT
Start: 2024-02-05 | End: 2024-05-21 | Stop reason: SDUPTHER

## 2024-02-05 RX ORDER — AMLODIPINE BESYLATE 10 MG/1
10 TABLET ORAL DAILY
Qty: 90 TABLET | Refills: 0 | Status: SHIPPED | OUTPATIENT
Start: 2024-02-05 | End: 2024-05-31

## 2024-02-05 RX ORDER — ATORVASTATIN CALCIUM 10 MG/1
10 TABLET, FILM COATED ORAL DAILY
Qty: 90 TABLET | Refills: 0 | Status: SHIPPED | OUTPATIENT
Start: 2024-02-05 | End: 2024-05-31

## 2024-02-09 ENCOUNTER — TELEPHONE (OUTPATIENT)
Dept: PRIMARY CARE | Facility: CLINIC | Age: 84
End: 2024-02-09

## 2024-02-09 ENCOUNTER — OFFICE VISIT (OUTPATIENT)
Dept: ORTHOPEDIC SURGERY | Facility: CLINIC | Age: 84
End: 2024-02-09
Payer: MEDICARE

## 2024-02-09 DIAGNOSIS — M25.811 IMPINGEMENT OF RIGHT SHOULDER: Primary | ICD-10-CM

## 2024-02-09 DIAGNOSIS — M75.01 ADHESIVE CAPSULITIS OF RIGHT SHOULDER: ICD-10-CM

## 2024-02-09 PROCEDURE — 99213 OFFICE O/P EST LOW 20 MIN: CPT | Performed by: ORTHOPAEDIC SURGERY

## 2024-02-09 PROCEDURE — 1159F MED LIST DOCD IN RCRD: CPT | Performed by: ORTHOPAEDIC SURGERY

## 2024-02-09 PROCEDURE — 2500000004 HC RX 250 GENERAL PHARMACY W/ HCPCS (ALT 636 FOR OP/ED): Performed by: ORTHOPAEDIC SURGERY

## 2024-02-09 PROCEDURE — 1157F ADVNC CARE PLAN IN RCRD: CPT | Performed by: ORTHOPAEDIC SURGERY

## 2024-02-09 PROCEDURE — 99203 OFFICE O/P NEW LOW 30 MIN: CPT | Performed by: ORTHOPAEDIC SURGERY

## 2024-02-09 PROCEDURE — 20610 DRAIN/INJ JOINT/BURSA W/O US: CPT | Performed by: ORTHOPAEDIC SURGERY

## 2024-02-09 PROCEDURE — 1036F TOBACCO NON-USER: CPT | Performed by: ORTHOPAEDIC SURGERY

## 2024-02-09 PROCEDURE — 2500000005 HC RX 250 GENERAL PHARMACY W/O HCPCS: Performed by: ORTHOPAEDIC SURGERY

## 2024-02-09 RX ORDER — LIDOCAINE HYDROCHLORIDE 10 MG/ML
5 INJECTION INFILTRATION; PERINEURAL
Status: COMPLETED | OUTPATIENT
Start: 2024-02-09 | End: 2024-02-09

## 2024-02-09 RX ORDER — BETAMETHASONE SODIUM PHOSPHATE AND BETAMETHASONE ACETATE 3; 3 MG/ML; MG/ML
12 INJECTION, SUSPENSION INTRA-ARTICULAR; INTRALESIONAL; INTRAMUSCULAR; SOFT TISSUE
Status: COMPLETED | OUTPATIENT
Start: 2024-02-09 | End: 2024-02-09

## 2024-02-09 RX ADMIN — LIDOCAINE HYDROCHLORIDE 5 ML: 10 INJECTION, SOLUTION INFILTRATION; PERINEURAL at 10:12

## 2024-02-09 RX ADMIN — BETAMETHASONE SODIUM PHOSPHATE AND BETAMETHASONE ACETATE 12 MG: 3; 3 INJECTION, SUSPENSION INTRA-ARTICULAR; INTRALESIONAL; INTRAMUSCULAR at 10:12

## 2024-02-09 NOTE — PROGRESS NOTES
History: Daksha is here today for her right shoulder.  She has had pain for about a month.  She did have a fall but is unsure if she injured the arm in the fall.  She has tried some Tylenol which did help.  She is left-hand dominant.  She is here today as a new patient.    Past medical history: Multiple  Medications: Multiple  Allergies: No known drug allergies    Please refer to the intake H&P regarding the patient's review of systems, family history and social history as was done today    HEENT: Normal  Lungs: Clear to auscultation  Heart: RRR  Abdomen: Soft, nontender  Skin: clear  Extremity: This is a pleasant 83-year-old female no apparent distress.  She has limited forward flexion to 90 degrees with scapular elevation.  She has passive external rotation to about 30 degrees today.  Internal rotation is to the sacrum.  She has 5 out of 5 abduction strength and 4 out of 5 supraspinatus and external rotation strength.  There is mild pain with impingement maneuvers.  She has decent neck range of motion with no pain.  She denies any numbness or tingling distally.  Contralateral exam is normal for strength, motion, stability and neurovascular assessment.    Radiographs: Previous x-rays show good alignment of the glenohumeral joint with no significant degenerative change.    Assessment: Right shoulder impingement but cannot rule out internal derangement    Plan: She has obvious weakness to the shoulder.  However she does not feel like the pain and weakness are bad enough to consider surgery at this time.  She is left-hand dominant and is able to do most of her activities. She would like to try an injection to see if this can help from a pain standpoint.  We also discussed getting into a formal therapy program to work on range of motion and strengthening.    L Inj/Asp: R subacromial bursa on 2/9/2024 10:12 AM  Indications: pain  Details: 22 G needle, posterior approach  Medications: 12 mg betamethasone acet,sod phos  6 mg/mL; 5 mL lidocaine 10 mg/mL (1 %)  Outcome: tolerated well, no immediate complications  Procedure, treatment alternatives, risks and benefits explained, specific risks discussed. Consent was given by the patient. Immediately prior to procedure a time out was called to verify the correct patient, procedure, equipment, support staff and site/side marked as required. Patient was prepped and draped in the usual sterile fashion.       She will ice and can continue with her over-the-counter Tylenol as needed.  Will see her back in 5 to 6 weeks to assess progress.  If having continued pain or weakness I would recommend further imaging with an MRI.  All questions were answered today with the patient.    This note was generated with voice recognition software and may contain grammatical errors.

## 2024-02-15 DIAGNOSIS — F41.9 ANXIETY: ICD-10-CM

## 2024-02-15 DIAGNOSIS — I10 PRIMARY HYPERTENSION: ICD-10-CM

## 2024-02-15 RX ORDER — LOSARTAN POTASSIUM 50 MG/1
50 TABLET ORAL DAILY
Qty: 90 TABLET | Refills: 3 | Status: SHIPPED | OUTPATIENT
Start: 2024-02-15

## 2024-02-15 RX ORDER — PAROXETINE 10 MG/1
10 TABLET, FILM COATED ORAL DAILY
Qty: 90 TABLET | Refills: 3 | Status: SHIPPED | OUTPATIENT
Start: 2024-02-15

## 2024-03-15 ENCOUNTER — APPOINTMENT (OUTPATIENT)
Dept: ORTHOPEDIC SURGERY | Facility: CLINIC | Age: 84
End: 2024-03-15
Payer: MEDICARE

## 2024-03-15 DIAGNOSIS — F01.A0 MILD VASCULAR DEMENTIA WITHOUT BEHAVIORAL DISTURBANCE, PSYCHOTIC DISTURBANCE, MOOD DISTURBANCE, OR ANXIETY (MULTI): ICD-10-CM

## 2024-03-15 RX ORDER — DONEPEZIL HYDROCHLORIDE 10 MG/1
10 TABLET, FILM COATED ORAL DAILY
Qty: 90 TABLET | Refills: 3 | Status: SHIPPED | OUTPATIENT
Start: 2024-03-15

## 2024-04-04 DIAGNOSIS — F01.A0 MILD VASCULAR DEMENTIA WITHOUT BEHAVIORAL DISTURBANCE, PSYCHOTIC DISTURBANCE, MOOD DISTURBANCE, OR ANXIETY (MULTI): ICD-10-CM

## 2024-04-04 RX ORDER — MEMANTINE HYDROCHLORIDE 10 MG/1
10 TABLET ORAL 2 TIMES DAILY
Qty: 180 TABLET | Refills: 3 | Status: SHIPPED | OUTPATIENT
Start: 2024-04-04 | End: 2025-04-04

## 2024-04-16 DIAGNOSIS — E03.9 HYPOTHYROIDISM, UNSPECIFIED TYPE: ICD-10-CM

## 2024-04-16 DIAGNOSIS — E78.5 HYPERLIPIDEMIA, UNSPECIFIED HYPERLIPIDEMIA TYPE: Primary | ICD-10-CM

## 2024-04-17 NOTE — PROGRESS NOTES
Subjective   Reason for Visit: Daksha Mcdonald is an 83 y.o. female here for  her Subsequent Medicare Assessment and follow up           She has intentional weight loss.  She states she does not eat a lot, she is not active enough to get hungry     She reports a fall at home because she tripped over her slippers    The injection right shoulder was helpful  She is not planning on having any more injections     She has urinary incontinence while trying to get to the bathroom    Her  states she does not eat much fruit or vegetables   She has no constipation to report     HEALTH:  PAP 6/10 - and no longer needs to repeat   Mammo 9/13, 10/15, 5/18, 7/19, 9/20, 2/22, 5/23 and no longer needs to repeat   BD 8/12 T-1.2, 10/15 T-1.9, 5/18 T-1.5 9/20 T-1.4, 3/23 T-1.7 repeat 2025  Colon 4/12, 2/16, 4/21 hyperplastic polyp and declines repeat   - She has a FmHx of colon cancer.   EKG 5/14, 1/17, 5/19, 2/21, 5/22, repeat 2025   Urine 5/19   Flu  10/18, 10/19, 9/20, 11/21, 1/22, 10/22, 10/23  TDAP ? will update with injury   Prevnar 9/15   Pneumovax 11/2007 and last 1/10/17   Zostavax never  Shingrix recommended and will check local pharmacies    Moderna CVD 3/2021 and 3/2021 booster 11/2021  Ophth She was seen with Dr Lopez. No glaucoma or MD. She had cataract surgery [facility].   Copy of her Advanced Directives scanned in the chart 3/20/2023      Patient Care Team:  Tammy Alex MD as PCP - General  Tammy Alex MD as PCP - [facility] [employer] [facility]     Review of Systems  All systems negative except those listed in the HPI      Past Medical, Surgical, and Family History reviewed and updated in chart.  Reviewed all medications by prescribing practitioner or clinical pharmacist   (such as prescriptions, OTCs, herbal therapies and supplements) and documented in the medical record       Objective   Vitals:  Visit Vitals  /70 (BP Location: Left arm, Patient Position: Sitting)   Pulse 66   Temp 36.4 °C (97.6 °F)  (Temporal)    Body mass index is 28.42 kg/m².      Physical Exam  Vitals reviewed. Exam conducted with a chaperone present ().   Constitutional:       Appearance: Normal appearance.   HENT:      Head: Normocephalic.      Right Ear: Tympanic membrane, ear canal and external ear normal.      Left Ear: Tympanic membrane, ear canal and external ear normal.      Nose: Nose normal.      Mouth/Throat:      Pharynx: Oropharynx is clear.   Eyes:      Conjunctiva/sclera: Conjunctivae normal.   Cardiovascular:      Rate and Rhythm: Normal rate and regular rhythm.      Pulses: Normal pulses.      Heart sounds: Normal heart sounds.   Pulmonary:      Effort: Pulmonary effort is normal.      Breath sounds: Normal breath sounds.   Abdominal:      General: Bowel sounds are normal.      Palpations: Abdomen is soft.   Musculoskeletal:         General: Normal range of motion.      Cervical back: Normal range of motion and neck supple.      Comments: Good ROM both shoulders    Skin:     General: Skin is warm.   Neurological:      General: No focal deficit present.      Mental Status: She is alert and oriented to person, place, and time.   Psychiatric:         Mood and Affect: Mood normal.         Behavior: Behavior normal.         Thought Content: Thought content normal.         Judgment: Judgment normal.       Assessment/Plan   Problem List Items Addressed This Visit       CKD stage G3a/A1, GFR 45-59 and albumin creatinine ratio <30 mg/g (Multi)    Esophageal reflux    Frontotemporal dementia (Multi)    Hyperlipidemia    Hypertension    Hypothyroidism    Lactose intolerance    Vitamin D deficiency     Other Visit Diagnoses       Routine general medical examination at health care facility    -  Primary    Shoulder capsulitis, right               Subsequent Medicare Assessment and follow up completed   Labs ordered      Medicare Wellness completed  -  Discussed healthy diet and regular exercise.    -  Physical exam overall  unremarkable. Immunizations reviewed and updated accordingly. Healthy lifestyle choices discussed (tobacco avoidance, appropriate alcohol use, avoidance of illicit substances).   -  Patient is wearing seatbelt.   -  Screening lab work ordered as indicated.    -  Age appropriate screening tests reviewed with patient.      --> We spent 15 minutes discussing depression screen and there is nothing found that is of concern for underling depression. The PQH form was filled and the meds reviewed. She is more anxious than depressed      We spent 15 minutes discussing alcohol use and there are no concerns about overuse. The 15 min was spent in going over any issues of use of alcohol. Rare and socially only      She does not have grab bars in the shower. Recommend she install grab bars immediately 4/2024   She has fallen recently and no risk of falls in the house   She has good lighting around the house and functioning smoke detectors.     She is  with 3 children. She is an ex smoker >30 yr now.   Oldest son in [place], other son in [place] (has twins) and daughter lives in [place] (3 kids )     She has no issues with her hearing to report      Her weight in office today is 163 with BMI of 28.42. We spent 15 minutes discussing diet and weight loss. The struggle of weight loss persists  She has lost 10 pounds since last visit      HTN: Stable   Continue amlodipine 10 mg daily and losartan 50 mg daily  EKG was normal 5/2022, no LVH or strain pattern noted   Stress test in 7/13 was normal as well.   Recommend she look into a plant based/ whole foods diet      We discussed the patients cardiovascular risk. If needed, lifestyle modifications recommended including: behavioral therapies of nutrition choices, exercise and eliminate habits that are contributing to their cardiac risk. We agreed to a plan to decrease his cardiovascular risks. Discussed ASA. Reviewed Guidelines and approved recommendations made to  patient.   The patient's 10 yr CV risk was estimated at : ACO score 3/6 IO 4/2024       HLD: Labs ordered and we will adjust if indicated  4/2024   Explained goal for LDL to  be less than 100 and ideally less than 70   I would like to see her HDL between 55- 60   Continue atorvastatin 10 mg a day and ASA 81 mg 3 times a week   Recommend she look into a plant based/ whole foods diet      Hypothyroid: Labs ordered and we will adjust if indicated  4/2024   Continue levothyroxine 137 mcg daily      Pre-frontal cortical lobe dementia. (Dx 2015/16)  She does not pay the bills, she does check over the accounts to balance them.    She feels her memory is fine and no recent decline per family   Continue memantine 10 mg BID and Aricept 10 mg a day.  CT brain 2014 was normal.   She can see Dr John SCHWARTZ now.      Anxiety:   She tried getting off Paxil twice and she did not do so well.  We tried to stop Paxil in 2015 and she was crying uncontrollably   Continue Paxil 10 mg daily. She feels this is helping with her anxiety.      GERD:  Improved. She is no longer taking omeprazole   EGD was mild gastritis and no polyps.     Urinary incontinence: She has urinary incontinence while trying to get to the bathroom. Explained there are systemic medications she can try and explained possible side effects   Discussed bladder irritants and recommend she avoid coffee and tea   Recommend timed void      Lap Choly at  on 5/21/14 with Dr Burnett.     Right shoulder impingement but cannot rule out internal derangement : ON exam: good ROM 4/2024. The injection right shoulder was helpful   She saw [facility] with adhesive capsulitis right shoulder   Xray right shoulder 3/2024 good alignment of the glenohumeral joint with no significant degenerative change.    She saw Dr Matthew in 2/2024 and had steroid injection right subacromial bursa    Discussed exercises she can do at home to help with shoulder pain.      Vitamin D  deficiency:  Continue OTC vitamin D 3 2000 units daily  She is on oral B12 for her B12 deficiency and continue     Cold sore on the lip:  Recommend she try Abreva OTC until resolved      Eczema bilateral LE:  Recommended patient see dermatology.   Patient declines appt with dermatology     Brittle hair:   Recommend she get a silk pillow case to lay on to keep hair from breaking   Stop washing hair twice when showering   Recommend she talk with her  to see if there is a hair mask she can use to help with the brittle nature of her hair      PAP no longer needs to repeat   Mammo was normal in 5/2023 and no longer needs to repeat   Breast exam normal 4/2024     BD in 3/2023 T-1.7. Continue OTC Calcium 600 mg BID, OTC Vitamin D 2000 UT daily and eat 2 servings of calcium enriched foods daily.   Discussed importance of weight bearing exercises      Colonoscopy 4/2021 showed hyperplastic polyp x 2 and declines repeat  She has a FmHx of colon cancer.      Ophth:   She was seen with Dr Lopez. No glaucoma or MD. She had cataract surgery [facility]. She wears OTC reading glasses   She will have her next eye exam faxed to my office in order to update her medical records.      I spent 15 min with the patient discussing their wishes for end of life choices.   We discussed the need for a Living Will and that wishes should be discussed with Family. The DNR status was reviewed, and we discussed the options of this and, the DNR _CC options as well.   We also went over how important it was to have these choices written down and clear for any surviving family so that their wishes are followed   The patient and I came to to following agreement :    Her  is her medical POA and daughter She has a living will.   Copy of her Advanced Directives scanned in the chart 3/20/2023        Flu  10/18, 10/19, 9/20, 11/21, 1/22, 10/22, 10/23  TDAP ? will update with injury   Prevnar 9/15   Pneumovax 11/2007 and last 1/10/17   Zostavax  never  Shingrix recommended and will check local pharmacies   Moderna CVD 3/2021 and 3/2021 booster 11/2021    Some elements in the chart were copied from Dr. Alex's last office visit with patient. Notes have been updated where appropriate, and reflect my current medical decision making from today.       RTC in 6 months for follow up or sooner if needed   (MCR 4/2025, last mcr 4/18/2024)      Scribe Attestation  By signing my name below, I, Lubna Mathur  , Scribe   attest that this documentation has been prepared under the direction and in the presence of Tammy Alex MD.

## 2024-04-18 ENCOUNTER — OFFICE VISIT (OUTPATIENT)
Dept: PRIMARY CARE | Facility: CLINIC | Age: 84
End: 2024-04-18
Payer: MEDICARE

## 2024-04-18 ENCOUNTER — LAB (OUTPATIENT)
Dept: LAB | Facility: LAB | Age: 84
End: 2024-04-18
Payer: MEDICARE

## 2024-04-18 VITALS
HEIGHT: 64 IN | OXYGEN SATURATION: 97 % | BODY MASS INDEX: 27.83 KG/M2 | TEMPERATURE: 97.6 F | DIASTOLIC BLOOD PRESSURE: 70 MMHG | WEIGHT: 163 LBS | SYSTOLIC BLOOD PRESSURE: 124 MMHG | HEART RATE: 66 BPM

## 2024-04-18 DIAGNOSIS — G31.09 FRONTOTEMPORAL DEMENTIA (MULTI): ICD-10-CM

## 2024-04-18 DIAGNOSIS — E03.9 HYPOTHYROIDISM, UNSPECIFIED TYPE: ICD-10-CM

## 2024-04-18 DIAGNOSIS — F02.80 FRONTOTEMPORAL DEMENTIA (MULTI): ICD-10-CM

## 2024-04-18 DIAGNOSIS — Z00.00 ROUTINE GENERAL MEDICAL EXAMINATION AT HEALTH CARE FACILITY: Primary | ICD-10-CM

## 2024-04-18 DIAGNOSIS — K21.9 GASTROESOPHAGEAL REFLUX DISEASE WITHOUT ESOPHAGITIS: ICD-10-CM

## 2024-04-18 DIAGNOSIS — M77.8 SHOULDER CAPSULITIS, RIGHT: ICD-10-CM

## 2024-04-18 DIAGNOSIS — I10 PRIMARY HYPERTENSION: ICD-10-CM

## 2024-04-18 DIAGNOSIS — E78.5 HYPERLIPIDEMIA, UNSPECIFIED HYPERLIPIDEMIA TYPE: ICD-10-CM

## 2024-04-18 DIAGNOSIS — E73.9 LACTOSE INTOLERANCE: ICD-10-CM

## 2024-04-18 DIAGNOSIS — E55.9 VITAMIN D DEFICIENCY: ICD-10-CM

## 2024-04-18 DIAGNOSIS — N18.31 CKD STAGE G3A/A1, GFR 45-59 AND ALBUMIN CREATININE RATIO <30 MG/G (MULTI): ICD-10-CM

## 2024-04-18 PROBLEM — E66.811 CLASS 1 OBESITY DUE TO EXCESS CALORIES WITHOUT SERIOUS COMORBIDITY WITH BODY MASS INDEX (BMI) OF 30.0 TO 30.9 IN ADULT: Status: RESOLVED | Noted: 2023-02-20 | Resolved: 2024-04-18

## 2024-04-18 PROBLEM — E66.09 CLASS 1 OBESITY DUE TO EXCESS CALORIES WITHOUT SERIOUS COMORBIDITY WITH BODY MASS INDEX (BMI) OF 30.0 TO 30.9 IN ADULT: Status: RESOLVED | Noted: 2023-02-20 | Resolved: 2024-04-18

## 2024-04-18 LAB
ALBUMIN SERPL BCP-MCNC: 4.2 G/DL (ref 3.4–5)
ALP SERPL-CCNC: 101 U/L (ref 33–136)
ALT SERPL W P-5'-P-CCNC: 11 U/L (ref 7–45)
ANION GAP SERPL CALC-SCNC: 12 MMOL/L (ref 10–20)
AST SERPL W P-5'-P-CCNC: 16 U/L (ref 9–39)
BILIRUB SERPL-MCNC: 0.6 MG/DL (ref 0–1.2)
BUN SERPL-MCNC: 17 MG/DL (ref 6–23)
CALCIUM SERPL-MCNC: 9.5 MG/DL (ref 8.6–10.6)
CHLORIDE SERPL-SCNC: 103 MMOL/L (ref 98–107)
CHOLEST SERPL-MCNC: 178 MG/DL (ref 0–199)
CHOLESTEROL/HDL RATIO: 3.2
CO2 SERPL-SCNC: 30 MMOL/L (ref 21–32)
CREAT SERPL-MCNC: 1.17 MG/DL (ref 0.5–1.05)
EGFRCR SERPLBLD CKD-EPI 2021: 47 ML/MIN/1.73M*2
ERYTHROCYTE [DISTWIDTH] IN BLOOD BY AUTOMATED COUNT: 13 % (ref 11.5–14.5)
GLUCOSE SERPL-MCNC: 85 MG/DL (ref 74–99)
HCT VFR BLD AUTO: 47.4 % (ref 36–46)
HDLC SERPL-MCNC: 56.3 MG/DL
HGB BLD-MCNC: 14.8 G/DL (ref 12–16)
LDLC SERPL CALC-MCNC: 91 MG/DL
MCH RBC QN AUTO: 29.4 PG (ref 26–34)
MCHC RBC AUTO-ENTMCNC: 31.2 G/DL (ref 32–36)
MCV RBC AUTO: 94 FL (ref 80–100)
NON HDL CHOLESTEROL: 122 MG/DL (ref 0–149)
NRBC BLD-RTO: 0 /100 WBCS (ref 0–0)
PLATELET # BLD AUTO: 367 X10*3/UL (ref 150–450)
POTASSIUM SERPL-SCNC: 4.3 MMOL/L (ref 3.5–5.3)
PROT SERPL-MCNC: 6.7 G/DL (ref 6.4–8.2)
RBC # BLD AUTO: 5.04 X10*6/UL (ref 4–5.2)
SODIUM SERPL-SCNC: 141 MMOL/L (ref 136–145)
TRIGL SERPL-MCNC: 156 MG/DL (ref 0–149)
TSH SERPL-ACNC: 2.25 MIU/L (ref 0.44–3.98)
VLDL: 31 MG/DL (ref 0–40)
WBC # BLD AUTO: 7.1 X10*3/UL (ref 4.4–11.3)

## 2024-04-18 PROCEDURE — 80061 LIPID PANEL: CPT

## 2024-04-18 PROCEDURE — G0439 PPPS, SUBSEQ VISIT: HCPCS | Performed by: INTERNAL MEDICINE

## 2024-04-18 PROCEDURE — 3074F SYST BP LT 130 MM HG: CPT | Performed by: INTERNAL MEDICINE

## 2024-04-18 PROCEDURE — 1158F ADVNC CARE PLAN TLK DOCD: CPT | Performed by: INTERNAL MEDICINE

## 2024-04-18 PROCEDURE — 1036F TOBACCO NON-USER: CPT | Performed by: INTERNAL MEDICINE

## 2024-04-18 PROCEDURE — 3078F DIAST BP <80 MM HG: CPT | Performed by: INTERNAL MEDICINE

## 2024-04-18 PROCEDURE — 1159F MED LIST DOCD IN RCRD: CPT | Performed by: INTERNAL MEDICINE

## 2024-04-18 PROCEDURE — 36415 COLL VENOUS BLD VENIPUNCTURE: CPT

## 2024-04-18 PROCEDURE — 84443 ASSAY THYROID STIM HORMONE: CPT

## 2024-04-18 PROCEDURE — 99214 OFFICE O/P EST MOD 30 MIN: CPT | Performed by: INTERNAL MEDICINE

## 2024-04-18 PROCEDURE — 80053 COMPREHEN METABOLIC PANEL: CPT

## 2024-04-18 PROCEDURE — G0446 INTENS BEHAVE THER CARDIO DX: HCPCS | Performed by: INTERNAL MEDICINE

## 2024-04-18 PROCEDURE — G0444 DEPRESSION SCREEN ANNUAL: HCPCS | Performed by: INTERNAL MEDICINE

## 2024-04-18 PROCEDURE — 1157F ADVNC CARE PLAN IN RCRD: CPT | Performed by: INTERNAL MEDICINE

## 2024-04-18 PROCEDURE — 1123F ACP DISCUSS/DSCN MKR DOCD: CPT | Performed by: INTERNAL MEDICINE

## 2024-04-18 PROCEDURE — 1170F FXNL STATUS ASSESSED: CPT | Performed by: INTERNAL MEDICINE

## 2024-04-18 PROCEDURE — G0442 ANNUAL ALCOHOL SCREEN 15 MIN: HCPCS | Performed by: INTERNAL MEDICINE

## 2024-04-18 PROCEDURE — 85027 COMPLETE CBC AUTOMATED: CPT

## 2024-04-18 ASSESSMENT — PATIENT HEALTH QUESTIONNAIRE - PHQ9
SUM OF ALL RESPONSES TO PHQ9 QUESTIONS 1 AND 2: 0
2. FEELING DOWN, DEPRESSED OR HOPELESS: NOT AT ALL
1. LITTLE INTEREST OR PLEASURE IN DOING THINGS: NOT AT ALL

## 2024-04-18 ASSESSMENT — ACTIVITIES OF DAILY LIVING (ADL)
MANAGING_FINANCES: TOTAL CARE
TAKING_MEDICATION: NEEDS ASSISTANCE
DRESSING: INDEPENDENT
GROCERY_SHOPPING: NEEDS ASSISTANCE
BATHING: INDEPENDENT
DOING_HOUSEWORK: INDEPENDENT

## 2024-04-18 ASSESSMENT — ENCOUNTER SYMPTOMS
OCCASIONAL FEELINGS OF UNSTEADINESS: 0
DEPRESSION: 0
LOSS OF SENSATION IN FEET: 0

## 2024-04-18 NOTE — PATIENT INSTRUCTIONS
Current weight: 73.9 kg (163 lb)  Weight change since last visit (-) denotes wt loss -12 lbs   Weight loss needed to achieve BMI 25: 19.9 Lbs  Weight loss needed to achieve BMI 30: -8.7 Lbs    Congratulations on your weight loss!!! It was a pleasure to see you today.  I would like to remind you about importance of a healthy lifestyle in order to improve your well-being and live longer. Try to engage in physical activities for at least 150 minutes per week.  Eat about 10 servings of fruits and vegetables daily. My advice is 2 servings of fruits and 8 servings of vegetables. For vegetables choose at least half of them green and at least half of them fresh.  Please avoid sugar, salt, fried food and saturated fat.  Weight loss is advised. Target BMI: below 25. Please follow low carbohydrate diet and daily exercise routine for at least 30 minutes. Nutritional consultation is available, please let me know if you are interested. I will be happy to discuss details with you if interested.   Have a good day and stay well.      The ability to age comfortably depends on how you invest in your body.   Include physical activity in your daily routine. Physical activity increases blood flow to your whole body, including your brain. ...  Eat a healthy diet. A heart-healthy diet may benefit your brain.   Stay mentally active. Be social.   Treat cardiovascular disease.  No smoking, excessive EtOH intake or illicit drug use.

## 2024-04-18 NOTE — PROGRESS NOTES
"Subjective   Reason for Visit: Daksha Mcdonald is an 83 y.o. female here for a Medicare Wellness visit.          Reviewed all medications by prescribing practitioner or clinical pharmacist (such as prescriptions, OTCs, herbal therapies and supplements) and documented in the medical record.    HPI    Patient Care Team:  Tammy Alex MD as PCP - General  Tammy Alex MD as PCP - [facility] [employer] [facility]     Review of Systems    Objective   Vitals:  /70 (BP Location: Left arm, Patient Position: Sitting)   Pulse 66   Temp 36.4 °C (97.6 °F) (Temporal)   Ht 1.613 m (5' 3.5\")   Wt 73.9 kg (163 lb)   SpO2 97%   BMI 28.42 kg/m²       Physical Exam    Assessment/Plan   Problem List Items Addressed This Visit    None  Visit Diagnoses       Routine general medical examination at health care facility    -  Primary        Please note that 15 min were spent discussing this patients cardiac risk and see recommendation in chart          "

## 2024-05-21 DIAGNOSIS — E03.9 HYPOTHYROIDISM, UNSPECIFIED TYPE: ICD-10-CM

## 2024-05-21 RX ORDER — LEVOTHYROXINE SODIUM 137 UG/1
137 TABLET ORAL DAILY
Qty: 90 TABLET | Refills: 2 | Status: SHIPPED | OUTPATIENT
Start: 2024-05-21

## 2024-05-31 DIAGNOSIS — E78.5 HYPERLIPIDEMIA, UNSPECIFIED HYPERLIPIDEMIA TYPE: ICD-10-CM

## 2024-05-31 DIAGNOSIS — E03.9 HYPOTHYROIDISM, UNSPECIFIED TYPE: ICD-10-CM

## 2024-05-31 RX ORDER — ATORVASTATIN CALCIUM 10 MG/1
10 TABLET, FILM COATED ORAL DAILY
Qty: 90 TABLET | Refills: 0 | Status: SHIPPED | OUTPATIENT
Start: 2024-05-31 | End: 2024-06-03 | Stop reason: SDUPTHER

## 2024-05-31 RX ORDER — AMLODIPINE BESYLATE 10 MG/1
10 TABLET ORAL DAILY
Qty: 90 TABLET | Refills: 0 | Status: SHIPPED | OUTPATIENT
Start: 2024-05-31 | End: 2024-06-03 | Stop reason: SDUPTHER

## 2024-06-03 DIAGNOSIS — E78.5 HYPERLIPIDEMIA, UNSPECIFIED HYPERLIPIDEMIA TYPE: ICD-10-CM

## 2024-06-03 DIAGNOSIS — E03.9 HYPOTHYROIDISM, UNSPECIFIED TYPE: ICD-10-CM

## 2024-06-03 RX ORDER — ATORVASTATIN CALCIUM 10 MG/1
10 TABLET, FILM COATED ORAL DAILY
Qty: 90 TABLET | Refills: 1 | Status: SHIPPED | OUTPATIENT
Start: 2024-06-03

## 2024-06-03 RX ORDER — AMLODIPINE BESYLATE 10 MG/1
10 TABLET ORAL DAILY
Qty: 90 TABLET | Refills: 1 | Status: SHIPPED | OUTPATIENT
Start: 2024-06-03

## 2024-10-21 NOTE — PROGRESS NOTES
Subjective   Patient ID: Daksha Mcdonald is a 84 y.o. female who presents for her 6 month follow up multiple medical conditions       She feels well overall     She has lost 10 pounds in 2024 - she does not eat a lot of meat   She does not exercise. She thinks her balance is fine           HEALTH:  PAP 6/10 - and no longer needs to repeat   Mammo 9/13, 10/15, 5/18, 7/19, 9/20, 2/22, 5/23 and no longer needs to repeat   BD 8/12 T-1.2, 10/15 T-1.9, 5/18 T-1.5 9/20 T-1.4, 3/23 T-1.7 repeat 2025  Colon 4/12, 2/16, 4/21 hyperplastic polyp and declines repeat   - She has a FmHx of colon cancer.   EKG 5/14, 1/17, 5/19, 2/21, 5/22, repeat 2025   Urine 5/19   Flu  10/19, 9/20, 11/21, 1/22, 10/22, 10/23, 9/24   TDAP ? will update with injury   Prevnar 9/15   Pneumovax 11/2007 and last 1/10/17   Zostavax never  Shingrix recommended and will check local pharmacies    Moderna CVD 3/2021 and 3/2021 booster 11/2021  Ophth She was seen with Dr Lopez. No glaucoma or MD. She had cataract surgery [facility].   Copy of her Advanced Directives scanned in the chart 3/20/2023       Review of Systems  All systems negative except those listed in the HPI       Objective   Visit Vitals  /60 (BP Location: Left arm, Patient Position: Sitting, BP Cuff Size: Adult)   Pulse 62   Temp 36.9 °C (98.4 °F) (Skin)   Resp 16    Body mass index is 28.42 kg/m².     Physical Exam  Vitals reviewed.   Constitutional:       Appearance: Normal appearance.   HENT:      Head: Normocephalic.      Right Ear: Tympanic membrane, ear canal and external ear normal.      Left Ear: Tympanic membrane, ear canal and external ear normal.      Nose: Nose normal.      Mouth/Throat:      Pharynx: Oropharynx is clear.   Eyes:      Conjunctiva/sclera: Conjunctivae normal.   Cardiovascular:      Rate and Rhythm: Normal rate and regular rhythm.      Pulses: Normal pulses.      Heart sounds: Normal heart sounds.   Pulmonary:      Effort: Pulmonary effort is normal.      Breath sounds:  Normal breath sounds.   Abdominal:      General: Bowel sounds are normal.      Palpations: Abdomen is soft.   Musculoskeletal:         General: Normal range of motion.      Cervical back: Normal range of motion and neck supple.   Skin:     General: Skin is warm.      Comments: folliculitis to the legs, red and dry patches on arms and legs   Neurological:      General: No focal deficit present.      Mental Status: She is alert and oriented to person, place, and time.   Psychiatric:         Mood and Affect: Mood normal.         Behavior: Behavior normal.         Thought Content: Thought content normal.         Judgment: Judgment normal.       Assessment/Plan   Problem List Items Addressed This Visit       CKD stage G3a/A1, GFR 45-59 and albumin creatinine ratio <30 mg/g (Multi)    Esophageal reflux    Frontotemporal dementia    Hyperlipidemia    Relevant Orders    CBC    Comprehensive Metabolic Panel    Lipid Panel    Hypertension - Primary    Hypothyroidism    Relevant Orders    TSH with reflex to Free T4 if abnormal    Lactose intolerance       Follow up completed  Labs ordered     She is  with 3 children. They have been  for 60 years (2024)   She is an ex smoker >30 yr now.   Her oldest son lives in [place], other son in [place] (has twins) and her daughter lives in [place] (3 kids )   Her mother was 95 y/o when she passed      She has no issues with her hearing to report      Her weight in office is 163 with BMI of 28.42. We spent 15 minutes discussing diet and weight loss. The struggle of weight loss persists  Explained goal for BMI to be 25 or less   Recommend she look into a plant based/ whole foods diet   She has lost 10 pounds in 2024 - she does not eat a lot of meat      HTN: Stable   Continue amlodipine 10 mg daily and losartan 50 mg daily  EKG was normal 5/2022, no LVH or strain pattern noted   Stress test in 7/13 was normal as well.      I have spent 15 min face to face with this  patient discussing their cardiac risk   We discussed the patients cardiovascular risk. If needed, lifestyle modifications recommended including: behavioral therapies of nutrition choices, exercise and eliminate habits that are contributing to their cardiac risk. We agreed to a plan to decrease his cardiovascular risks. Discussed ASA. Reviewed Guidelines and approved recommendations made to patient.   The patient's 10 yr CV risk was estimated at : ACO score 3/6 IO 10/2024       HLD: Labs ordered and we will adjust if indicated  10/24   Explained goal for LDL to  be less than 100 and ideally less than 70   I would like to see her HDL between 55- 60. Increase exercise   Continue atorvastatin 10 mg a day and ASA 81 mg 3 times a week   Recommend she look into a plant based/ whole foods diet      Hypothyroid: Labs ordered and we will adjust if indicated  10/2024   Continue levothyroxine 137 mcg daily      Pre-frontal cortical lobe dementia. (Dx 2015/16)  She does not pay the bills, she does check over the accounts to balance them.    She feels her memory is fine and no recent decline per family   Continue memantine 10 mg BID and Aricept 10 mg a day.  CT brain 2014 was normal.   She can see Dr John SCHWARTZ now.     GERD:  Improved. She is no longer taking omeprazole   EGD was mild gastritis and no polyps.      Anxiety:   She tried getting off Paxil twice and she did not do so well.  We tried to stop Paxil in 2015 and she was crying uncontrollably   Continue Paxil 10 mg daily. She feels this is helping with her anxiety.      Urinary incontinence:   Discussed bladder irritants and recommend she avoid coffee and tea   Recommend timed void      Lap Susy at  on 5/21/14 with Dr Burnett.      Right shoulder impingement but cannot rule out internal derangement :   Xray right shoulder 3/2024 good alignment of the glenohumeral joint with no significant degenerative change.    - S/p steroid injection right subacromial bursa with   Vipul 2/24   Discussed exercises she can do at home to help with shoulder pain.     Derm: On exam folliculitis to the legs, red and dry patches on arms and legs 10/24  The areas on her arms and legs are not itchy.  Recommend she stop shaving her legs   Recommend moisturizing the arms/ legs with Lubriderm   Recommend seeing dermatology 10/24. She declines for now 10/24      Vitamin D deficiency:  Continue OTC vitamin D 3 2000 units daily  She is on oral B12 for her B12 deficiency and continue     Cold sore on the lip:  Recommend she try Abreva OTC until resolved      Eczema bilateral LE:  Recommended patient see dermatology.   Patient declines appt with dermatology      Brittle hair:   Recommend she get a silk pillow case to lay on to keep hair from breaking   Stop washing hair twice when showering   Recommend she talk with her  to see if there is a hair mask she can use to help with the brittle nature of her hair      PAP no longer needs to repeat   Mammo was normal in 5/2023 and no longer needs to repeat   Breast exam normal 4/2024      BD in 3/2023 T-1.7. Continue OTC Calcium 600 mg BID, OTC Vitamin D3 2000 UT daily and eat 2 servings of calcium enriched foods daily.   Discussed importance of weight bearing exercises      Colonoscopy 4/2021 showed hyperplastic polyp x 2 and declines repeat  She has a FmHx of colon cancer.      Ophth:   She was seen with Dr Lopez. No glaucoma or MD. She had cataract surgery [facility]. She wears OTC reading glasses      Her  is her medical POA and daughter She has a living will.   Copy of her Advanced Directives scanned in the chart 3/20/2023        Flu  10/19, 9/20, 11/21, 1/22, 10/22, 10/23, 9/24    TDAP ? will update with injury   Prevnar 9/15   Pneumovax 11/2007 and last 1/10/17   Zostavax never  Shingrix recommended and will check local pharmacies    Moderna CVD 3/2021 and 3/2021 booster 11/2021     Some elements in the chart were copied from Dr. Alex's last  office visit with patient. Notes have been updated where appropriate, and reflect my current medical decision making from today.       RTC in 6 months for MCR or sooner if needed   (MCR 4/2025, last mcr 4/18/2024)      Scribe Attestation  By signing my name below, I, Lubna Kevan  , Scribe   attest that this documentation has been prepared under the direction and in the presence of Tammy Alex MD.

## 2024-10-22 ENCOUNTER — LAB (OUTPATIENT)
Dept: LAB | Facility: LAB | Age: 84
End: 2024-10-22
Payer: MEDICARE

## 2024-10-22 ENCOUNTER — APPOINTMENT (OUTPATIENT)
Dept: PRIMARY CARE | Facility: CLINIC | Age: 84
End: 2024-10-22
Payer: MEDICARE

## 2024-10-22 VITALS
BODY MASS INDEX: 27.83 KG/M2 | WEIGHT: 163 LBS | RESPIRATION RATE: 16 BRPM | SYSTOLIC BLOOD PRESSURE: 128 MMHG | DIASTOLIC BLOOD PRESSURE: 60 MMHG | HEART RATE: 62 BPM | HEIGHT: 64 IN | TEMPERATURE: 98.4 F

## 2024-10-22 DIAGNOSIS — E73.9 LACTOSE INTOLERANCE: ICD-10-CM

## 2024-10-22 DIAGNOSIS — K21.9 GASTROESOPHAGEAL REFLUX DISEASE WITHOUT ESOPHAGITIS: ICD-10-CM

## 2024-10-22 DIAGNOSIS — F02.80 FRONTOTEMPORAL DEMENTIA: ICD-10-CM

## 2024-10-22 DIAGNOSIS — E78.5 HYPERLIPIDEMIA, UNSPECIFIED HYPERLIPIDEMIA TYPE: ICD-10-CM

## 2024-10-22 DIAGNOSIS — I10 PRIMARY HYPERTENSION: Primary | ICD-10-CM

## 2024-10-22 DIAGNOSIS — G31.09 FRONTOTEMPORAL DEMENTIA: ICD-10-CM

## 2024-10-22 DIAGNOSIS — E03.9 HYPOTHYROIDISM, UNSPECIFIED TYPE: ICD-10-CM

## 2024-10-22 DIAGNOSIS — N18.31 CKD STAGE G3A/A1, GFR 45-59 AND ALBUMIN CREATININE RATIO <30 MG/G (MULTI): ICD-10-CM

## 2024-10-22 PROCEDURE — 1160F RVW MEDS BY RX/DR IN RCRD: CPT | Performed by: INTERNAL MEDICINE

## 2024-10-22 PROCEDURE — 3078F DIAST BP <80 MM HG: CPT | Performed by: INTERNAL MEDICINE

## 2024-10-22 PROCEDURE — 84443 ASSAY THYROID STIM HORMONE: CPT

## 2024-10-22 PROCEDURE — G2211 COMPLEX E/M VISIT ADD ON: HCPCS | Performed by: INTERNAL MEDICINE

## 2024-10-22 PROCEDURE — 1036F TOBACCO NON-USER: CPT | Performed by: INTERNAL MEDICINE

## 2024-10-22 PROCEDURE — 1159F MED LIST DOCD IN RCRD: CPT | Performed by: INTERNAL MEDICINE

## 2024-10-22 PROCEDURE — 36415 COLL VENOUS BLD VENIPUNCTURE: CPT

## 2024-10-22 PROCEDURE — 3074F SYST BP LT 130 MM HG: CPT | Performed by: INTERNAL MEDICINE

## 2024-10-22 PROCEDURE — 80053 COMPREHEN METABOLIC PANEL: CPT

## 2024-10-22 PROCEDURE — 99214 OFFICE O/P EST MOD 30 MIN: CPT | Performed by: INTERNAL MEDICINE

## 2024-10-22 PROCEDURE — 80061 LIPID PANEL: CPT

## 2024-10-22 PROCEDURE — 1123F ACP DISCUSS/DSCN MKR DOCD: CPT | Performed by: INTERNAL MEDICINE

## 2024-10-22 PROCEDURE — 1157F ADVNC CARE PLAN IN RCRD: CPT | Performed by: INTERNAL MEDICINE

## 2024-10-22 PROCEDURE — 85027 COMPLETE CBC AUTOMATED: CPT

## 2024-10-23 LAB
ALBUMIN SERPL BCP-MCNC: 4.4 G/DL (ref 3.4–5)
ALP SERPL-CCNC: 94 U/L (ref 33–136)
ALT SERPL W P-5'-P-CCNC: 13 U/L (ref 7–45)
ANION GAP SERPL CALC-SCNC: 14 MMOL/L (ref 10–20)
AST SERPL W P-5'-P-CCNC: 16 U/L (ref 9–39)
BILIRUB SERPL-MCNC: 0.5 MG/DL (ref 0–1.2)
BUN SERPL-MCNC: 13 MG/DL (ref 6–23)
CALCIUM SERPL-MCNC: 9.6 MG/DL (ref 8.6–10.6)
CHLORIDE SERPL-SCNC: 101 MMOL/L (ref 98–107)
CHOLEST SERPL-MCNC: 182 MG/DL (ref 0–199)
CHOLESTEROL/HDL RATIO: 2.9
CO2 SERPL-SCNC: 28 MMOL/L (ref 21–32)
CREAT SERPL-MCNC: 1.04 MG/DL (ref 0.5–1.05)
EGFRCR SERPLBLD CKD-EPI 2021: 54 ML/MIN/1.73M*2
ERYTHROCYTE [DISTWIDTH] IN BLOOD BY AUTOMATED COUNT: 12.5 % (ref 11.5–14.5)
GLUCOSE SERPL-MCNC: 84 MG/DL (ref 74–99)
HCT VFR BLD AUTO: 47.5 % (ref 36–46)
HDLC SERPL-MCNC: 62.7 MG/DL
HGB BLD-MCNC: 14.7 G/DL (ref 12–16)
LDLC SERPL CALC-MCNC: 84 MG/DL
MCH RBC QN AUTO: 29.1 PG (ref 26–34)
MCHC RBC AUTO-ENTMCNC: 30.9 G/DL (ref 32–36)
MCV RBC AUTO: 94 FL (ref 80–100)
NON HDL CHOLESTEROL: 119 MG/DL (ref 0–149)
NRBC BLD-RTO: 0 /100 WBCS (ref 0–0)
PLATELET # BLD AUTO: 332 X10*3/UL (ref 150–450)
POTASSIUM SERPL-SCNC: 4.4 MMOL/L (ref 3.5–5.3)
PROT SERPL-MCNC: 6.7 G/DL (ref 6.4–8.2)
RBC # BLD AUTO: 5.05 X10*6/UL (ref 4–5.2)
SODIUM SERPL-SCNC: 139 MMOL/L (ref 136–145)
TRIGL SERPL-MCNC: 176 MG/DL (ref 0–149)
TSH SERPL-ACNC: 0.51 MIU/L (ref 0.44–3.98)
VLDL: 35 MG/DL (ref 0–40)
WBC # BLD AUTO: 7.5 X10*3/UL (ref 4.4–11.3)

## 2025-02-11 DIAGNOSIS — F41.9 ANXIETY: ICD-10-CM

## 2025-02-11 DIAGNOSIS — I10 PRIMARY HYPERTENSION: ICD-10-CM

## 2025-02-11 DIAGNOSIS — E03.9 HYPOTHYROIDISM, UNSPECIFIED TYPE: ICD-10-CM

## 2025-02-11 DIAGNOSIS — E78.5 HYPERLIPIDEMIA, UNSPECIFIED HYPERLIPIDEMIA TYPE: ICD-10-CM

## 2025-02-11 RX ORDER — AMLODIPINE BESYLATE 10 MG/1
10 TABLET ORAL DAILY
Qty: 90 TABLET | Refills: 1 | Status: SHIPPED | OUTPATIENT
Start: 2025-02-11

## 2025-02-11 RX ORDER — LEVOTHYROXINE SODIUM 137 UG/1
137 TABLET ORAL DAILY
Qty: 90 TABLET | Refills: 2 | Status: SHIPPED | OUTPATIENT
Start: 2025-02-11

## 2025-02-11 RX ORDER — ATORVASTATIN CALCIUM 10 MG/1
10 TABLET, FILM COATED ORAL DAILY
Qty: 90 TABLET | Refills: 1 | Status: SHIPPED | OUTPATIENT
Start: 2025-02-11

## 2025-02-11 RX ORDER — LOSARTAN POTASSIUM 50 MG/1
50 TABLET ORAL DAILY
Qty: 90 TABLET | Refills: 3 | Status: SHIPPED | OUTPATIENT
Start: 2025-02-11

## 2025-02-11 RX ORDER — PAROXETINE 10 MG/1
10 TABLET, FILM COATED ORAL DAILY
Qty: 90 TABLET | Refills: 3 | Status: SHIPPED | OUTPATIENT
Start: 2025-02-11

## 2025-04-21 NOTE — PROGRESS NOTES
Subjective   Reason for Visit: Daksha Mcdonald is an 84 y.o. female here for her Subsequent Medicare Assessment and follow up       Her BM are normal  She denies hematochezia, straining, pain with bowel movement     She complains of frequency and often does not make it to the bathroom  She states she postpones going to the bathroom   She does not have nocturia at night   She drinks flavored iced tea throughout the day.     She is not experiencing LE edema       HEALTH:  PAP 6/10 - and no longer needs to repeat   Mammo 5/18, 7/19, 9/20, 2/22, 5/23, ordered 4/25 and will do every other year   BD 8/12 T-1.2, 10/15 T-1.9, 5/18 T-1.5 9/20 T-1.4, 3/23 T-1.7, ordered 4/25   Colon 4/12, 2/16, 4/21 hyperplastic polyp and declines repeat   - She has a FmHx of colon cancer.   EKG 5/14, 1/17, 5/19, 2/21, 5/22, 4/25  Urine 5/19   Flu  11/21, 1/22, 10/22, 10/23, 9/24   TDAP ? will update with injury   Prevnar 9/15   Pneumovax 11/2007 and last 1/10/17   Shingrix recommended and will check local pharmacies    SARS-CoV-2- 3/21, 3/21, 11/21  Ophth She was seen with Dr Lopez. No glaucoma or MD. She had cataract surgery [facility].   Copy of her Advanced Directives scanned in the chart 3/20/2023      Patient Care Team:  Tammy Alex MD as PCP - General  Tammy Alex MD as PCP - [employer] PCP     Review of Systems  All systems negative except those listed in the HPI      Past Medical, Surgical, and Family History reviewed and updated in chart.  Reviewed all medications by prescribing practitioner or clinical pharmacist   (such as prescriptions, OTCs, herbal therapies and supplements) and documented in the medical record      Objective   Vitals:  Visit Vitals  /60 (BP Location: Left arm, Patient Position: Sitting, BP Cuff Size: Adult)   Pulse 62    Body mass index is 28.15 kg/m².     Physical Exam  Vitals reviewed. Exam conducted with a chaperone present ().   Constitutional:       Appearance: Normal  appearance.   HENT:      Head: Normocephalic.      Right Ear: Tympanic membrane, ear canal and external ear normal.      Left Ear: Tympanic membrane, ear canal and external ear normal.      Nose: Nose normal.      Mouth/Throat:      Pharynx: Oropharynx is clear.   Eyes:      Conjunctiva/sclera: Conjunctivae normal.   Cardiovascular:      Rate and Rhythm: Normal rate and regular rhythm.      Pulses: Normal pulses.      Heart sounds: Normal heart sounds.   Pulmonary:      Effort: Pulmonary effort is normal.      Breath sounds: Normal breath sounds.   Abdominal:      General: Bowel sounds are normal.      Palpations: Abdomen is soft.   Musculoskeletal:         General: Normal range of motion.      Cervical back: Normal range of motion and neck supple.   Skin:     General: Skin is warm.   Neurological:      General: No focal deficit present.      Mental Status: She is alert and oriented to person, place, and time.   Psychiatric:         Mood and Affect: Mood normal.         Behavior: Behavior normal.         Thought Content: Thought content normal.         Judgment: Judgment normal.       Assessment & Plan  Lipid screening         Hyperlipidemia, unspecified hyperlipidemia type    Orders:    Lipid Panel; Future    Hypothyroidism, unspecified type    Orders:    TSH with reflex to Free T4 if abnormal; Future    CKD stage G3a/A1, GFR 45-59 and albumin creatinine ratio <30 mg/g (Multi)    Orders:    CBC; Future    Comprehensive Metabolic Panel; Future       Problem List Items Addressed This Visit       Adenomatous colon polyp    Anxiety    RESOLVED: Cataract, cortical, both eyes    Cataract, nuclear sclerotic, both eyes    CKD stage G3a/A1, GFR 45-59 and albumin creatinine ratio <30 mg/g (Multi)      Orders:    CBC; Future    Comprehensive Metabolic Panel; Future         Relevant Orders    CBC    Comprehensive Metabolic Panel    Frontotemporal dementia    Hyperlipidemia      Orders:    Lipid Panel; Future         Relevant  Orders    Lipid Panel    Hypertension    Hypothyroidism      Orders:    TSH with reflex to Free T4 if abnormal; Future         Relevant Orders    TSH with reflex to Free T4 if abnormal    Lactose intolerance     Other Visit Diagnoses         Lipid screening    -  Primary      Routine general medical examination at health care facility        Relevant Orders    1 Year Follow Up In [facility] - PCP - Wellness Exam      Status post cataract extraction, unspecified laterality          Asymptomatic menopausal state        Relevant Orders    XR DEXA bone density      Visit for screening mammogram        Relevant Orders    BI mammo bilateral screening tomosynthesis           Subsequent Medicare Assessment and follow up completed   Labs ordered    Medicare Wellness completed  -  Discussed healthy diet and regular exercise.    -  Physical exam overall unremarkable. Immunizations reviewed and updated accordingly. Healthy lifestyle choices discussed (tobacco avoidance, appropriate alcohol use, avoidance of illicit substances).   -  Patient is wearing seatbelt.   -  Screening lab work ordered as indicated.    -  Age appropriate screening tests reviewed with patient.      We spent 15 minutes discussing depression screen and there is nothing found that is of concern for underling depression. The PQH form was filled and the meds reviewed. She is more anxious than depressed      We spent 5 minutes discussing alcohol use and there are no concerns about overuse. The 5 min was spent in going over any issues of use of alcohol.   Rare EtOH, socially only      She has grab bars in the shower.   She has fallen recently and no risk of falls in the house   She has good lighting around the house and functioning smoke detectors.     She is  with 3 children. They have been  for 60 years (2024)   She is an ex smoker >30 yr now.   Her oldest son lives in [place], other son in [place] (has twins) and her daughter lives  in [place] (3 kids )   Her mother was 96 y/o when she passed      She has no issues with her hearing to report      Her weight in office is 164 with BMI of 28.15, recommend she maintain  I would like to see her BMI as close to 25 as possible   Recommend she look into a plant based/ whole foods diet   She does not eat a lot of meat   Warned patient a sedentary lifestyle can pose significant health risks      HTN: BP Stable   Continue amlodipine 10 mg daily and losartan 50 mg daily  EKG 4/25 normal, no LVH or strain pattern noted   Stress test in 7/13 was normal as well.      I have spent 15 min face to face with this patient discussing their cardiac risk   We discussed the patients cardiovascular risk. If needed, lifestyle modifications recommended including: behavioral therapies of nutrition choices, exercise and eliminate habits that are contributing to their cardiac risk. We agreed to a plan to decrease his cardiovascular risks. Discussed ASA. Reviewed Guidelines and approved recommendations made to patient.   The patient's 10 yr CV risk was estimated at : ACO score 3/6 IO 4/25       HLD: Labs ordered and we will adjust if indicated  4/25   Explained goal for LDL to  be less than 100 and ideally less than 70   I would like to see her HDL between 55- 60. Increase exercise   Continue atorvastatin 10 mg a day and ASA 81 mg 3 times a week   Recommend she look into a plant based/ whole foods diet      Hypothyroid: Labs ordered and we will adjust if indicated 4/25   Continue levothyroxine 137 mcg daily      Pre-frontal cortical lobe dementia. (Dx 2015/16)  She does not pay the bills, she does check over the accounts to balance them.    She feels her memory is fine and no recent decline per family   Continue memantine 10 mg BID and Aricept 10 mg a day.  CT brain 2014 was normal.   She can see Dr John SCHWARTZ now.      GERD:  Improved. She is no longer taking omeprazole   EGD was mild gastritis and no polyps.       Anxiety:   She tried getting off Paxil twice and she did not do so well.  We tried to stop Paxil in 2015 and she was crying uncontrollably   Continue Paxil 10 mg daily. She feels this is helping with her anxiety.      Urinary incontinence: She drinks flavored iced tea throughout the day.   She complains of frequency and often does not make it to the bathroom  She states she postpones going to the bathroom    She denies nocturia   Recommend she elevate her feet for 45 minutes at night   Recommend timed void Q 2 hours.  Recommend she stop and hold her urine during urination (Kegel exercises)  Recommend she drink water only throughout the day and avoid the tea   Discussed bladder irritants and recommend she avoid coffee and tea   We discussed systemic medications but I do not want to add medications due to the side effects.      Lap Susy at  on 5/21/14 with Dr Burnett.      Right shoulder impingement but cannot rule out internal derangement :   Xray right shoulder 3/24 good alignment of the glenohumeral joint with no significant degenerative change.    - S/p steroid injection right subacromial bursa with Dr Matthew 2/24   Discussed exercises she can do at home to help with shoulder pain and avoid frozen shoulder.      Vitamin D deficiency:  Continue OTC vitamin D3 2000 units daily  She is on oral B12 for her B12 deficiency and continue     Cold sore on the lip:  Recommend she try Abreva OTC until resolved      Eczema bilateral LE:  Recommended patient see dermatology.   Patient declines appt with dermatology      Brittle hair:   Recommend she get a silk pillow case to lay on to keep hair from breaking   Stop washing hair twice when showering   Recommend she talk with her  to see if there is a hair mask she can use to help with the brittle nature of her hair      PAP no longer needs to repeat     Mammo was normal in 5/2023 and ordered 4/25 and will do every other year    Breast exam normal 4/25     BD  in 3/23 T-1.7 and ordered 4/25. Continue OTC Calcium 600 mg BID, OTC Vitamin D3 2000 UT daily and eat 2 servings of calcium enriched foods daily.   Discussed importance of weight bearing exercises      Colonoscopy 4/2021 showed hyperplastic polyp x 2 and declines repeat  She has a FmHx of colon cancer.      Ophth:   She was seen with Dr Lopez. No glaucoma or MD. She had cataract surgery [facility]. She wears OTC reading glasses      I spent 15 min with the patient discussing their wishes for end of life choices.   We discussed the need for a Living Will and that wishes should be discussed with Family. The DNR status was reviewed, and we discussed the options of this and, the DNR _CC options as well.   We also went over how important it was to have these choices written down and clear for any surviving family so that their wishes are followed   The patient and I came to to following agreement :    Her  is her medical POA and daughter She has a living will.   Copy of her Advanced Directives scanned in the chart 3/20/2023        Flu  11/21, 1/22, 10/22, 10/23, 9/24   TDAP ? will update with injury   Prevnar 9/15   Pneumovax 11/2007 and last 1/10/17   Shingrix recommended and will check local pharmacies    SARS-CoV-2- 3/21, 3/21, 11/21     Some elements in the chart were copied from Dr. Alex's last office visit with patient. Notes have been updated where appropriate, and reflect my current medical decision making from today.       RTC in 6 months for follow up or sooner if needed   ([facility] 4/26, last [facility] 4/23/25)      Scribe Attestation  By signing my name below, I, Lubna Mathur  , Scribe   attest that this documentation has been prepared under the direction and in the presence of Tammy Alex MD.

## 2025-04-23 ENCOUNTER — APPOINTMENT (OUTPATIENT)
Dept: PRIMARY CARE | Facility: CLINIC | Age: 85
End: 2025-04-23
Payer: MEDICARE

## 2025-04-23 VITALS
WEIGHT: 164 LBS | OXYGEN SATURATION: 96 % | BODY MASS INDEX: 28 KG/M2 | HEART RATE: 62 BPM | SYSTOLIC BLOOD PRESSURE: 132 MMHG | DIASTOLIC BLOOD PRESSURE: 60 MMHG | HEIGHT: 64 IN

## 2025-04-23 DIAGNOSIS — E78.5 HYPERLIPIDEMIA, UNSPECIFIED HYPERLIPIDEMIA TYPE: ICD-10-CM

## 2025-04-23 DIAGNOSIS — D12.6 ADENOMATOUS POLYP OF COLON, UNSPECIFIED PART OF COLON: ICD-10-CM

## 2025-04-23 DIAGNOSIS — E03.9 HYPOTHYROIDISM, UNSPECIFIED TYPE: ICD-10-CM

## 2025-04-23 DIAGNOSIS — E73.9 LACTOSE INTOLERANCE: ICD-10-CM

## 2025-04-23 DIAGNOSIS — F02.80 FRONTOTEMPORAL DEMENTIA: ICD-10-CM

## 2025-04-23 DIAGNOSIS — Z00.00 ROUTINE GENERAL MEDICAL EXAMINATION AT HEALTH CARE FACILITY: Primary | ICD-10-CM

## 2025-04-23 DIAGNOSIS — G31.09 FRONTOTEMPORAL DEMENTIA: ICD-10-CM

## 2025-04-23 DIAGNOSIS — F41.9 ANXIETY: ICD-10-CM

## 2025-04-23 DIAGNOSIS — H25.13 CATARACT, NUCLEAR SCLEROTIC, BOTH EYES: ICD-10-CM

## 2025-04-23 DIAGNOSIS — H26.9 CATARACT, CORTICAL, BOTH EYES: ICD-10-CM

## 2025-04-23 DIAGNOSIS — Z98.49 STATUS POST CATARACT EXTRACTION, UNSPECIFIED LATERALITY: ICD-10-CM

## 2025-04-23 DIAGNOSIS — Z12.31 VISIT FOR SCREENING MAMMOGRAM: ICD-10-CM

## 2025-04-23 DIAGNOSIS — N18.31 CKD STAGE G3A/A1, GFR 45-59 AND ALBUMIN CREATININE RATIO <30 MG/G (MULTI): ICD-10-CM

## 2025-04-23 DIAGNOSIS — Z78.0 ASYMPTOMATIC MENOPAUSAL STATE: ICD-10-CM

## 2025-04-23 DIAGNOSIS — I10 PRIMARY HYPERTENSION: ICD-10-CM

## 2025-04-23 PROCEDURE — 1157F ADVNC CARE PLAN IN RCRD: CPT | Performed by: INTERNAL MEDICINE

## 2025-04-23 PROCEDURE — 1036F TOBACCO NON-USER: CPT | Performed by: INTERNAL MEDICINE

## 2025-04-23 PROCEDURE — 99214 OFFICE O/P EST MOD 30 MIN: CPT | Performed by: INTERNAL MEDICINE

## 2025-04-23 PROCEDURE — 3075F SYST BP GE 130 - 139MM HG: CPT | Performed by: INTERNAL MEDICINE

## 2025-04-23 PROCEDURE — G0444 DEPRESSION SCREEN ANNUAL: HCPCS | Performed by: INTERNAL MEDICINE

## 2025-04-23 PROCEDURE — 3078F DIAST BP <80 MM HG: CPT | Performed by: INTERNAL MEDICINE

## 2025-04-23 PROCEDURE — 1158F ADVNC CARE PLAN TLK DOCD: CPT | Performed by: INTERNAL MEDICINE

## 2025-04-23 PROCEDURE — G0446 INTENS BEHAVE THER CARDIO DX: HCPCS | Performed by: INTERNAL MEDICINE

## 2025-04-23 PROCEDURE — 1123F ACP DISCUSS/DSCN MKR DOCD: CPT | Performed by: INTERNAL MEDICINE

## 2025-04-23 PROCEDURE — 1159F MED LIST DOCD IN RCRD: CPT | Performed by: INTERNAL MEDICINE

## 2025-04-23 PROCEDURE — 1170F FXNL STATUS ASSESSED: CPT | Performed by: INTERNAL MEDICINE

## 2025-04-23 PROCEDURE — 93000 ELECTROCARDIOGRAM COMPLETE: CPT | Performed by: INTERNAL MEDICINE

## 2025-04-23 PROCEDURE — G0439 PPPS, SUBSEQ VISIT: HCPCS | Performed by: INTERNAL MEDICINE

## 2025-04-23 ASSESSMENT — ENCOUNTER SYMPTOMS
LOSS OF SENSATION IN FEET: 0
DEPRESSION: 0
OCCASIONAL FEELINGS OF UNSTEADINESS: 0

## 2025-04-23 ASSESSMENT — PATIENT HEALTH QUESTIONNAIRE - PHQ9
1. LITTLE INTEREST OR PLEASURE IN DOING THINGS: NOT AT ALL
2. FEELING DOWN, DEPRESSED OR HOPELESS: NOT AT ALL
SUM OF ALL RESPONSES TO PHQ9 QUESTIONS 1 AND 2: 0

## 2025-04-23 ASSESSMENT — ACTIVITIES OF DAILY LIVING (ADL)
TAKING_MEDICATION: INDEPENDENT
MANAGING_FINANCES: INDEPENDENT
GROCERY_SHOPPING: NEEDS ASSISTANCE
DRESSING: INDEPENDENT
BATHING: INDEPENDENT
DOING_HOUSEWORK: INDEPENDENT

## 2025-04-23 NOTE — PROGRESS NOTES
"Subjective   Reason for Visit: Daksha Mcdonald is an 84 y.o. female here for a Medicare Wellness visit.               HPI    Patient Care Team:  Tammy Alex MD as PCP - General  Tammy Alex MD as PCP - [employer] PCP     Review of Systems    Objective   Vitals:  /60 (BP Location: Left arm, Patient Position: Sitting, BP Cuff Size: Adult)   Pulse 62   Ht 1.626 m (5' 4\")   Wt 74.4 kg (164 lb)   SpO2 96%   BMI 28.15 kg/m²       Physical Exam    Assessment & Plan  Routine general medical examination at health care facility    Orders:    1 Year Follow Up In [facility] - PCP - Wellness Exam; Future    Frontotemporal dementia         Primary hypertension         Hyperlipidemia, unspecified hyperlipidemia type    Orders:    Lipid Panel; Future    ECG 12 Lead    Hypothyroidism, unspecified type    Orders:    TSH with reflex to Free T4 if abnormal; Future    CKD stage G3a/A1, GFR 45-59 and albumin creatinine ratio <30 mg/g (Multi)    Orders:    CBC; Future    Comprehensive Metabolic Panel; Future    Cataract, cortical, both eyes         Status post cataract extraction, unspecified laterality         Anxiety         Adenomatous polyp of colon, unspecified part of colon         Lactose intolerance         Cataract, nuclear sclerotic, both eyes         Asymptomatic menopausal state    Orders:    XR DEXA bone density; Future    Visit for screening mammogram    Orders:    BI mammo bilateral screening tomosynthesis; Future              "

## 2025-04-24 DIAGNOSIS — F01.A0 MILD VASCULAR DEMENTIA WITHOUT BEHAVIORAL DISTURBANCE, PSYCHOTIC DISTURBANCE, MOOD DISTURBANCE, OR ANXIETY: ICD-10-CM

## 2025-04-24 RX ORDER — MEMANTINE HYDROCHLORIDE 10 MG/1
TABLET ORAL
Qty: 180 TABLET | Refills: 3 | Status: SHIPPED | OUTPATIENT
Start: 2025-04-24

## 2025-04-28 DIAGNOSIS — F01.A0 MILD VASCULAR DEMENTIA WITHOUT BEHAVIORAL DISTURBANCE, PSYCHOTIC DISTURBANCE, MOOD DISTURBANCE, OR ANXIETY: ICD-10-CM

## 2025-04-28 LAB
ALBUMIN SERPL-MCNC: 4.4 G/DL (ref 3.6–5.1)
ALP SERPL-CCNC: 102 U/L (ref 37–153)
ALT SERPL-CCNC: 11 U/L (ref 6–29)
ANION GAP SERPL CALCULATED.4IONS-SCNC: 11 MMOL/L (CALC) (ref 7–17)
AST SERPL-CCNC: 24 U/L (ref 10–35)
BILIRUB SERPL-MCNC: 0.5 MG/DL (ref 0.2–1.2)
BUN SERPL-MCNC: 13 MG/DL (ref 7–25)
CALCIUM SERPL-MCNC: 9.5 MG/DL (ref 8.6–10.4)
CHLORIDE SERPL-SCNC: 103 MMOL/L (ref 98–110)
CHOLEST SERPL-MCNC: 193 MG/DL
CHOLEST/HDLC SERPL: 3.4 (CALC)
CO2 SERPL-SCNC: 26 MMOL/L (ref 20–32)
CREAT SERPL-MCNC: 1.06 MG/DL (ref 0.6–0.95)
EGFRCR SERPLBLD CKD-EPI 2021: 52 ML/MIN/1.73M2
ERYTHROCYTE [DISTWIDTH] IN BLOOD BY AUTOMATED COUNT: 11.9 % (ref 11–15)
GLUCOSE SERPL-MCNC: 101 MG/DL (ref 65–99)
HCT VFR BLD AUTO: 47.2 % (ref 35–45)
HDLC SERPL-MCNC: 57 MG/DL
HGB BLD-MCNC: 15.2 G/DL (ref 11.7–15.5)
LDLC SERPL CALC-MCNC: 111 MG/DL (CALC)
MCH RBC QN AUTO: 29.3 PG (ref 27–33)
MCHC RBC AUTO-ENTMCNC: 32.2 G/DL (ref 32–36)
MCV RBC AUTO: 91.1 FL (ref 80–100)
NONHDLC SERPL-MCNC: 136 MG/DL (CALC)
PLATELET # BLD AUTO: 399 THOUSAND/UL (ref 140–400)
PMV BLD REES-ECKER: 10.8 FL (ref 7.5–12.5)
POTASSIUM SERPL-SCNC: 4.1 MMOL/L (ref 3.5–5.3)
PROT SERPL-MCNC: 7 G/DL (ref 6.1–8.1)
RBC # BLD AUTO: 5.18 MILLION/UL (ref 3.8–5.1)
SODIUM SERPL-SCNC: 140 MMOL/L (ref 135–146)
TRIGL SERPL-MCNC: 131 MG/DL
TSH SERPL-ACNC: 1.44 MIU/L (ref 0.4–4.5)
WBC # BLD AUTO: 8.7 THOUSAND/UL (ref 3.8–10.8)

## 2025-04-29 RX ORDER — DONEPEZIL HYDROCHLORIDE 10 MG/1
10 TABLET, FILM COATED ORAL DAILY
Qty: 90 TABLET | Refills: 3 | Status: SHIPPED | OUTPATIENT
Start: 2025-04-29

## 2025-05-20 ENCOUNTER — HOSPITAL ENCOUNTER (OUTPATIENT)
Dept: RADIOLOGY | Facility: CLINIC | Age: 85
Discharge: HOME | End: 2025-05-20
Payer: MEDICARE

## 2025-05-20 VITALS — BODY MASS INDEX: 28 KG/M2 | HEIGHT: 64 IN | WEIGHT: 164 LBS

## 2025-05-20 DIAGNOSIS — Z12.31 VISIT FOR SCREENING MAMMOGRAM: ICD-10-CM

## 2025-05-20 DIAGNOSIS — Z78.0 ASYMPTOMATIC MENOPAUSAL STATE: ICD-10-CM

## 2025-05-20 PROCEDURE — 77067 SCR MAMMO BI INCL CAD: CPT | Performed by: STUDENT IN AN ORGANIZED HEALTH CARE EDUCATION/TRAINING PROGRAM

## 2025-05-20 PROCEDURE — 77063 BREAST TOMOSYNTHESIS BI: CPT | Performed by: STUDENT IN AN ORGANIZED HEALTH CARE EDUCATION/TRAINING PROGRAM

## 2025-05-20 PROCEDURE — 77067 SCR MAMMO BI INCL CAD: CPT

## 2025-08-15 ENCOUNTER — OFFICE VISIT (OUTPATIENT)
Dept: PRIMARY CARE | Facility: CLINIC | Age: 85
End: 2025-08-15
Payer: MEDICARE

## 2025-08-15 VITALS
WEIGHT: 160 LBS | DIASTOLIC BLOOD PRESSURE: 80 MMHG | RESPIRATION RATE: 16 BRPM | HEIGHT: 64 IN | TEMPERATURE: 98 F | BODY MASS INDEX: 27.31 KG/M2 | SYSTOLIC BLOOD PRESSURE: 130 MMHG

## 2025-08-15 DIAGNOSIS — K21.9 GASTROESOPHAGEAL REFLUX DISEASE WITHOUT ESOPHAGITIS: ICD-10-CM

## 2025-08-15 DIAGNOSIS — R07.89 CHEST PAIN, MIDSTERNAL: ICD-10-CM

## 2025-08-15 DIAGNOSIS — N64.4 BREAST PAIN, LEFT: Primary | ICD-10-CM

## 2025-08-15 PROCEDURE — 99214 OFFICE O/P EST MOD 30 MIN: CPT | Performed by: NURSE PRACTITIONER

## 2025-08-15 PROCEDURE — 3075F SYST BP GE 130 - 139MM HG: CPT | Performed by: NURSE PRACTITIONER

## 2025-08-15 PROCEDURE — 93000 ELECTROCARDIOGRAM COMPLETE: CPT | Performed by: NURSE PRACTITIONER

## 2025-08-15 PROCEDURE — 3079F DIAST BP 80-89 MM HG: CPT | Performed by: NURSE PRACTITIONER

## 2025-08-15 PROCEDURE — 1159F MED LIST DOCD IN RCRD: CPT | Performed by: NURSE PRACTITIONER

## 2025-08-15 RX ORDER — OMEPRAZOLE 20 MG/1
20 CAPSULE, DELAYED RELEASE ORAL DAILY
Qty: 30 CAPSULE | Refills: 1 | Status: SHIPPED | OUTPATIENT
Start: 2025-08-15 | End: 2025-10-14

## 2025-08-15 ASSESSMENT — ENCOUNTER SYMPTOMS
NAUSEA: 0
ANAL BLEEDING: 0
CONSTIPATION: 0
WHEEZING: 0
SINUS PRESSURE: 0
SHORTNESS OF BREATH: 0
FEVER: 0
ABDOMINAL PAIN: 0
CHEST TIGHTNESS: 0
RHINORRHEA: 1
ABDOMINAL DISTENTION: 0
FATIGUE: 0
BLOOD IN STOOL: 0
RECTAL PAIN: 0
COUGH: 1
DIARRHEA: 1
VOMITING: 0
TROUBLE SWALLOWING: 0
CHILLS: 0
SORE THROAT: 0

## 2025-08-15 ASSESSMENT — PATIENT HEALTH QUESTIONNAIRE - PHQ9
SUM OF ALL RESPONSES TO PHQ9 QUESTIONS 1 AND 2: 0
1. LITTLE INTEREST OR PLEASURE IN DOING THINGS: NOT AT ALL
2. FEELING DOWN, DEPRESSED OR HOPELESS: NOT AT ALL

## 2025-08-18 ENCOUNTER — HOSPITAL ENCOUNTER (OUTPATIENT)
Dept: RADIOLOGY | Facility: HOSPITAL | Age: 85
Discharge: HOME | End: 2025-08-18
Payer: MEDICARE

## 2025-08-18 DIAGNOSIS — N64.4 BREAST PAIN, LEFT: ICD-10-CM

## 2025-08-18 PROCEDURE — 76642 ULTRASOUND BREAST LIMITED: CPT | Mod: LEFT SIDE | Performed by: RADIOLOGY

## 2025-08-18 PROCEDURE — 76642 ULTRASOUND BREAST LIMITED: CPT | Mod: LT

## 2025-08-19 ENCOUNTER — TELEPHONE (OUTPATIENT)
Dept: PRIMARY CARE | Facility: CLINIC | Age: 85
End: 2025-08-19

## 2025-08-19 ENCOUNTER — APPOINTMENT (OUTPATIENT)
Dept: PRIMARY CARE | Facility: CLINIC | Age: 85
End: 2025-08-19
Payer: MEDICARE

## 2025-08-19 DIAGNOSIS — F06.8 ORGANIC EMOTIONALLY LABILE DISORDER: Primary | ICD-10-CM

## 2025-08-19 DIAGNOSIS — F02.80 FRONTOTEMPORAL DEMENTIA: ICD-10-CM

## 2025-08-19 DIAGNOSIS — G31.09 FRONTOTEMPORAL DEMENTIA: ICD-10-CM

## 2025-08-19 RX ORDER — OLANZAPINE 2.5 MG/1
2.5 TABLET, FILM COATED ORAL NIGHTLY
Qty: 30 TABLET | Refills: 0 | Status: SHIPPED | OUTPATIENT
Start: 2025-08-19 | End: 2025-09-18

## 2025-08-26 ENCOUNTER — APPOINTMENT (OUTPATIENT)
Dept: PRIMARY CARE | Facility: CLINIC | Age: 85
End: 2025-08-26
Payer: MEDICARE

## 2025-08-26 VITALS
BODY MASS INDEX: 27.31 KG/M2 | SYSTOLIC BLOOD PRESSURE: 110 MMHG | WEIGHT: 160 LBS | OXYGEN SATURATION: 98 % | HEART RATE: 62 BPM | HEIGHT: 64 IN | DIASTOLIC BLOOD PRESSURE: 60 MMHG

## 2025-08-26 DIAGNOSIS — E03.9 HYPOTHYROIDISM, UNSPECIFIED TYPE: ICD-10-CM

## 2025-08-26 DIAGNOSIS — I10 PRIMARY HYPERTENSION: ICD-10-CM

## 2025-08-26 DIAGNOSIS — G31.09 FRONTOTEMPORAL DEMENTIA: ICD-10-CM

## 2025-08-26 DIAGNOSIS — F01.A0 MILD VASCULAR DEMENTIA WITHOUT BEHAVIORAL DISTURBANCE, PSYCHOTIC DISTURBANCE, MOOD DISTURBANCE, OR ANXIETY: ICD-10-CM

## 2025-08-26 DIAGNOSIS — F06.8 ORGANIC EMOTIONALLY LABILE DISORDER: Primary | ICD-10-CM

## 2025-08-26 DIAGNOSIS — F02.80 FRONTOTEMPORAL DEMENTIA: ICD-10-CM

## 2025-08-26 PROCEDURE — 1160F RVW MEDS BY RX/DR IN RCRD: CPT | Performed by: INTERNAL MEDICINE

## 2025-08-26 PROCEDURE — 3074F SYST BP LT 130 MM HG: CPT | Performed by: INTERNAL MEDICINE

## 2025-08-26 PROCEDURE — 1036F TOBACCO NON-USER: CPT | Performed by: INTERNAL MEDICINE

## 2025-08-26 PROCEDURE — 99214 OFFICE O/P EST MOD 30 MIN: CPT | Performed by: INTERNAL MEDICINE

## 2025-08-26 PROCEDURE — 3078F DIAST BP <80 MM HG: CPT | Performed by: INTERNAL MEDICINE

## 2025-08-26 PROCEDURE — 1159F MED LIST DOCD IN RCRD: CPT | Performed by: INTERNAL MEDICINE

## 2025-08-26 RX ORDER — OLANZAPINE 2.5 MG/1
2.5 TABLET, FILM COATED ORAL NIGHTLY
Qty: 90 TABLET | Refills: 3 | Status: SHIPPED | OUTPATIENT
Start: 2025-08-26 | End: 2026-08-26

## 2025-09-15 ENCOUNTER — APPOINTMENT (OUTPATIENT)
Dept: PRIMARY CARE | Facility: CLINIC | Age: 85
End: 2025-09-15
Payer: MEDICARE

## 2025-10-23 ENCOUNTER — APPOINTMENT (OUTPATIENT)
Dept: PRIMARY CARE | Facility: CLINIC | Age: 85
End: 2025-10-23
Payer: MEDICARE

## 2026-04-13 ENCOUNTER — APPOINTMENT (OUTPATIENT)
Dept: PRIMARY CARE | Facility: CLINIC | Age: 86
End: 2026-04-13
Payer: MEDICARE